# Patient Record
Sex: MALE | Race: WHITE | NOT HISPANIC OR LATINO | URBAN - METROPOLITAN AREA
[De-identification: names, ages, dates, MRNs, and addresses within clinical notes are randomized per-mention and may not be internally consistent; named-entity substitution may affect disease eponyms.]

---

## 2017-05-23 ENCOUNTER — EMERGENCY (EMERGENCY)
Facility: HOSPITAL | Age: 67
LOS: 1 days | Discharge: ANOTHER HOSPITAL | End: 2017-05-23
Attending: EMERGENCY MEDICINE
Payer: COMMERCIAL

## 2017-05-23 VITALS
OXYGEN SATURATION: 99 % | HEART RATE: 67 BPM | DIASTOLIC BLOOD PRESSURE: 84 MMHG | RESPIRATION RATE: 18 BRPM | WEIGHT: 259.93 LBS | HEIGHT: 67 IN | TEMPERATURE: 98 F | SYSTOLIC BLOOD PRESSURE: 139 MMHG

## 2017-05-23 DIAGNOSIS — R51 HEADACHE: ICD-10-CM

## 2017-05-23 DIAGNOSIS — G70.00 MYASTHENIA GRAVIS WITHOUT (ACUTE) EXACERBATION: ICD-10-CM

## 2017-05-23 DIAGNOSIS — G08 INTRACRANIAL AND INTRASPINAL PHLEBITIS AND THROMBOPHLEBITIS: ICD-10-CM

## 2017-05-23 LAB
ALBUMIN SERPL ELPH-MCNC: 3.5 G/DL — SIGNIFICANT CHANGE UP (ref 3.3–5)
ALP SERPL-CCNC: 63 U/L — SIGNIFICANT CHANGE UP (ref 40–120)
ALT FLD-CCNC: 28 U/L — SIGNIFICANT CHANGE UP (ref 12–78)
ANION GAP SERPL CALC-SCNC: 11 MMOL/L — SIGNIFICANT CHANGE UP (ref 5–17)
APTT BLD: 31.1 SEC — SIGNIFICANT CHANGE UP (ref 27.5–37.4)
AST SERPL-CCNC: 36 U/L — SIGNIFICANT CHANGE UP (ref 15–37)
BASOPHILS # BLD AUTO: 0.1 K/UL — SIGNIFICANT CHANGE UP (ref 0–0.2)
BASOPHILS NFR BLD AUTO: 0.7 % — SIGNIFICANT CHANGE UP (ref 0–2)
BILIRUB SERPL-MCNC: 0.5 MG/DL — SIGNIFICANT CHANGE UP (ref 0.2–1.2)
BUN SERPL-MCNC: 21 MG/DL — SIGNIFICANT CHANGE UP (ref 7–23)
CALCIUM SERPL-MCNC: 8.7 MG/DL — SIGNIFICANT CHANGE UP (ref 8.5–10.1)
CHLORIDE SERPL-SCNC: 104 MMOL/L — SIGNIFICANT CHANGE UP (ref 96–108)
CK MB BLD-MCNC: 1.1 % — SIGNIFICANT CHANGE UP (ref 0–3.5)
CK MB CFR SERPL CALC: 1.6 NG/ML — SIGNIFICANT CHANGE UP (ref 0.5–3.6)
CK SERPL-CCNC: 143 U/L — SIGNIFICANT CHANGE UP (ref 26–308)
CO2 SERPL-SCNC: 24 MMOL/L — SIGNIFICANT CHANGE UP (ref 22–31)
CREAT SERPL-MCNC: 1.05 MG/DL — SIGNIFICANT CHANGE UP (ref 0.5–1.3)
EOSINOPHIL # BLD AUTO: 0.1 K/UL — SIGNIFICANT CHANGE UP (ref 0–0.5)
EOSINOPHIL NFR BLD AUTO: 0.7 % — SIGNIFICANT CHANGE UP (ref 0–6)
GLUCOSE SERPL-MCNC: 141 MG/DL — HIGH (ref 70–99)
HCT VFR BLD CALC: 43.8 % — SIGNIFICANT CHANGE UP (ref 39–50)
HGB BLD-MCNC: 15.2 G/DL — SIGNIFICANT CHANGE UP (ref 13–17)
INR BLD: 1.12 RATIO — SIGNIFICANT CHANGE UP (ref 0.88–1.16)
LYMPHOCYTES # BLD AUTO: 1.8 K/UL — SIGNIFICANT CHANGE UP (ref 1–3.3)
LYMPHOCYTES # BLD AUTO: 14.7 % — SIGNIFICANT CHANGE UP (ref 13–44)
MCHC RBC-ENTMCNC: 29.5 PG — SIGNIFICANT CHANGE UP (ref 27–34)
MCHC RBC-ENTMCNC: 34.6 GM/DL — SIGNIFICANT CHANGE UP (ref 32–36)
MCV RBC AUTO: 85.2 FL — SIGNIFICANT CHANGE UP (ref 80–100)
MONOCYTES # BLD AUTO: 0.7 K/UL — SIGNIFICANT CHANGE UP (ref 0–0.9)
MONOCYTES NFR BLD AUTO: 6.1 % — SIGNIFICANT CHANGE UP (ref 2–14)
NEUTROPHILS # BLD AUTO: 9.5 K/UL — HIGH (ref 1.8–7.4)
NEUTROPHILS NFR BLD AUTO: 77.9 % — HIGH (ref 43–77)
PLATELET # BLD AUTO: 229 K/UL — SIGNIFICANT CHANGE UP (ref 150–400)
POTASSIUM SERPL-MCNC: 4.6 MMOL/L — SIGNIFICANT CHANGE UP (ref 3.5–5.3)
POTASSIUM SERPL-SCNC: 4.6 MMOL/L — SIGNIFICANT CHANGE UP (ref 3.5–5.3)
PROT SERPL-MCNC: 7.5 GM/DL — SIGNIFICANT CHANGE UP (ref 6–8.3)
PROTHROM AB SERPL-ACNC: 12.2 SEC — SIGNIFICANT CHANGE UP (ref 9.8–12.7)
RBC # BLD: 5.14 M/UL — SIGNIFICANT CHANGE UP (ref 4.2–5.8)
RBC # FLD: 13 % — SIGNIFICANT CHANGE UP (ref 11–15)
SODIUM SERPL-SCNC: 139 MMOL/L — SIGNIFICANT CHANGE UP (ref 135–145)
TROPONIN I SERPL-MCNC: <.015 NG/ML — SIGNIFICANT CHANGE UP (ref 0.01–0.04)
WBC # BLD: 12.2 K/UL — HIGH (ref 3.8–10.5)
WBC # FLD AUTO: 12.2 K/UL — HIGH (ref 3.8–10.5)

## 2017-05-23 PROCEDURE — 70498 CT ANGIOGRAPHY NECK: CPT | Mod: 26

## 2017-05-23 PROCEDURE — 99292 CRITICAL CARE ADDL 30 MIN: CPT

## 2017-05-23 PROCEDURE — 99291 CRITICAL CARE FIRST HOUR: CPT

## 2017-05-23 PROCEDURE — 70496 CT ANGIOGRAPHY HEAD: CPT | Mod: 26

## 2017-05-23 PROCEDURE — 70450 CT HEAD/BRAIN W/O DYE: CPT | Mod: 26,59

## 2017-05-23 RX ORDER — MORPHINE SULFATE 50 MG/1
4 CAPSULE, EXTENDED RELEASE ORAL ONCE
Qty: 0 | Refills: 0 | Status: DISCONTINUED | OUTPATIENT
Start: 2017-05-23 | End: 2017-05-23

## 2017-05-23 RX ORDER — HEPARIN SODIUM 5000 [USP'U]/ML
4500 INJECTION INTRAVENOUS; SUBCUTANEOUS EVERY 6 HOURS
Qty: 0 | Refills: 0 | Status: DISCONTINUED | OUTPATIENT
Start: 2017-05-23 | End: 2017-05-27

## 2017-05-23 RX ORDER — ACETAMINOPHEN 500 MG
975 TABLET ORAL ONCE
Qty: 0 | Refills: 0 | Status: COMPLETED | OUTPATIENT
Start: 2017-05-23 | End: 2017-05-23

## 2017-05-23 RX ORDER — HEPARIN SODIUM 5000 [USP'U]/ML
INJECTION INTRAVENOUS; SUBCUTANEOUS
Qty: 25000 | Refills: 0 | Status: DISCONTINUED | OUTPATIENT
Start: 2017-05-23 | End: 2017-05-27

## 2017-05-23 RX ORDER — HEPARIN SODIUM 5000 [USP'U]/ML
9500 INJECTION INTRAVENOUS; SUBCUTANEOUS EVERY 6 HOURS
Qty: 0 | Refills: 0 | Status: DISCONTINUED | OUTPATIENT
Start: 2017-05-23 | End: 2017-05-27

## 2017-05-23 RX ORDER — ONDANSETRON 8 MG/1
8 TABLET, FILM COATED ORAL ONCE
Qty: 0 | Refills: 0 | Status: COMPLETED | OUTPATIENT
Start: 2017-05-23 | End: 2017-05-23

## 2017-05-23 RX ORDER — HEPARIN SODIUM 5000 [USP'U]/ML
9500 INJECTION INTRAVENOUS; SUBCUTANEOUS ONCE
Qty: 0 | Refills: 0 | Status: COMPLETED | OUTPATIENT
Start: 2017-05-23 | End: 2017-05-24

## 2017-05-23 RX ADMIN — Medication 975 MILLIGRAM(S): at 23:07

## 2017-05-23 NOTE — ED PROVIDER NOTE - OBJECTIVE STATEMENT
66 year old male with PMH of myasthenia gravis presenting to ED due to headache x 3 days, with sudden acute hearing loss noted today about 45 minutes prior to arrival in ED. Denies any other focal deficits otherwise feeling some neck discomfort and headache has been splitting now for 3 days. Pt seen at triage as a possible code stroke - immediate CT head performed - will plan on CTA shortly after.

## 2017-05-23 NOTE — ED PROVIDER NOTE - MEDICAL DECISION MAKING DETAILS
pt noted to have left transverse sinus and sigmoid sinus thrombosis noted otherwise to start heparin in ED - called Dr. Goyal who asked for Neurosurgery consult - Dr. Olmedo consulted to transfer for further care -

## 2017-05-23 NOTE — ED PROVIDER NOTE - CRITICAL CARE PROVIDED
direct patient care (not related to procedure)/interpretation of diagnostic studies/consultation with other physicians

## 2017-05-23 NOTE — CONSULT NOTE ADULT - SUBJECTIVE AND OBJECTIVE BOX
Subjective Complaints:  Historian:           ER notes also reviewed,.  Consult requested by ER doctor:                  Attending:     NARINDER:   PHOENIX MOLINA:  · Chief Complaint: The patient is a 66y Male complaining of headache.  HPI Objective Statement:  PMH of myasthenia gravis presenting to ED due to headache x 3 days, with sudden acute hearing loss noted today about 45 minutes prior to arrival in ED. Denies any other focal deficits otherwise feeling some neck discomfort and headache has been splitting now for 3 days. Pt seen at triage as a possible code stroke - immediate CT head performed - presently undergoing brain and neck  CTA.  Patient in Radiology     Radiology Results:  Unremarkable finding of the brain    PAST MEDICAL & SURGICAL HISTORY:  Myasthenia gravis    MEDICATIONS  (STANDING):  MEDICATIONS  (PRN):  Allergies: No Known Allergies  Intolerances  FAMILY HISTORY:    Vital Signs Last 24 Hrs  T(C): 36.6, Max: 36.6 (05-23 @ 19:27)  T(F): 97.8, Max: 97.8 (05-23 @ 19:27)  HR: 70 (67 - 70)  BP: 102/60 (102/60 - 139/84)  BP(mean): --  RR: 18 (18 - 18)  SpO2: 97% (97% - 99%)    NEUROLOGICAL EXAM:  HENT:  Normocephalic head; atraumatic head.  Neck supple.  ENT: normal looking.  Mental State:    Alert.  Fully oriented to person, place and date.  Coherent.  Speech clear and intact.  Cooperative.  Responds appropriately.    Cranial Nerves:  II-XII:   Pupils round and reactive to light and accommodation.  Extraocular movements full.  Visual fields full (no homonymous hemianopsia).  Visual acuity wnl.  Facial symmetry intact.  Tongue midline.  Motor Functions:  Moves all extremities.  No pronator drift of UE.  Claps hand well.  Hand  intact bilaterally.  Ambulatory.    Sensory Functions:   Intact to touch and pinprick to face and extremities.    Reflexes:  Deep tendon reflexes normoactive to biceps, knees and ankles.  Babinski absent (present).  Cerebellar Testing:    Finger to nose intact.  Nystagmus absent.  Neurovascular: Carotid auscultation full without bruits.      LABS:                        15.2   12.2  )-----------( 229      ( 23 May 2017 20:50 )             43.8     05-23    139  |  104  |  21  ----------------------------<  141<H>  4.6   |  24  |  1.05    Ca    8.7      23 May 2017 20:50    TPro  7.5  /  Alb  3.5  /  TBili  0.5  /  DBili  x   /  AST  36  /  ALT  28  /  AlkPhos  63  05-23    PT/INR - ( 23 May 2017 20:49 )   PT: 12.2 sec;   INR: 1.12 ratio         PTT - ( 23 May 2017 20:49 )  PTT:31.1 sec    RADIOLOGY & ADDITIONAL STUDIES:    CT Brain Stroke Protocol: Urgent   Indication: headache 3 days, loss of hearing 45 min  Transport: Stretcher-Crib  Exam Completed  Provider&#x27;s Contact #: [phone] (05-23 @ 19:41)  National Institutes of Health Stroke Scale Score:     Time/Priority:  STAT    Additional Instructions:  Type score here ___ (05-23 @ 19:43)  Comprehensive Metabolic Panel: STAT (05-23 @ 19:43)  Complete Blood Count + Automated Diff: STAT (05-23 @ 19:43)  Troponin I, Serum: STAT (05-23 @ 19:43)  Creatine Kinase, Serum: STAT (05-23 @ 19:43)  CKMB Mass Assay: STAT (05-23 @ 19:43)  Troponin I, Serum: STAT  Cancel Reason: Lab Reord. (05-23 @ 19:43)  Vital Signs:     Frequency:  every 1 hour (05-23 @ 19:43)  Pulse Oximetry:   Frequency: &lt;Continuous&gt;    Additional Instructions:  Notify Provider if oxygen saturation is LESS THAN 92% (05-23 @ 19:43)  Cardiac Monitor Bedside:     Time/Priority:  STAT (05-23 @ 19:43)  12 Lead ECG:   Provider&#x27;s Contact #: [phone] (05-23 @ 19:43)  Dysphagia Screening:     Time/Priority:  STAT (05-23 @ 19:43)  Assess Neurological Status:     Type of Neuro Check:  Stroke    Frequency:  every 1 hour    Additional Instructions:  Perform stroke neurological check (05-23 @ 19:43)  Provider to RN:       Assess Level of Consciousness, using [place] Coma Scale (05-23 @ 19:43)  Education:     Other: Stroke education    Additional Instructions: Distribute Stroke education material and provide stroke education (05-23 @ 19:43)  Blood Glucose Point Of Care Testing:     Frequency:  once (05-23 @ 19:43)  Activity - Bedrest (05-23 @ 19:43)  Fall Risk Protocol:     Time/Priority:  STAT    Additional Instructions:  Follow fall risk protocol (05-23 @ 19:43)  Aspiration Precautions:     Time/Priority:  Routine (05-23 @ 19:43)  Seizure Precautions:     Time/Priority:  Routine (05-23 @ 19:43)  Diet, NPO (05-23 @ 19:43)  Type + Screen: Routine  Cancel Reason: Hemolyzed Redraw (05-23 @ 19:47)  Prothrombin Time and INR, Plasma:  Start Date:23-May-2017. STAT (05-23 @ 19:48)  Activated Partial Thromboplastin Time:  Start Date:23-May-2017. STAT (05-23 @ 19:48)  CT Angio Neck w/ IV Cont: Urgent   Indication: neck pain, headache  loss of hearing  Transport: Stretcher-Crib  Provider&#x27;s Contact #: [phone] (05-23 @ 20:11)  CT Angio Head w/ IV Cont: Urgent   Indication: neck pain, headache  loss of hearing  Transport: Stretcher-Crib  Exam in Progress  Provider&#x27;s Contact #: [phone] (05-23 @ 20:11)  Antibody Screen: 20:23  Cancel Reason: Hemolyzed Redraw (05-23 @ 20:49)    Assessment & Opinion:    Recommendations:  Brain MRI.  Carotid doppler.  Echocardiogram.  EEG.   DVT prophylaxis as ordered.  Medications: Subjective Complaints:  Historian:  Patient (somewhat inconsistent with his complaints).  Wife and daughter at bedside.   ER notes also reviewed,.    HPI:   PHOENIX TRACY:  · Chief Complaint: The patient is a 66y Male complaining of headache.  HPI Objective Statement:  PMH of myasthenia gravis presenting to ED due to headache x 3 days, with sudden acute hearing loss noted today about 45 minutes prior to arrival in ED. Denies any other focal deficits otherwise feeling some neck discomfort and headache has been splitting now for 3 days. Pt seen at triage as a possible code stroke - immediate CT head performed - presently undergoing brain and neck  CTA.  Patient in Radiology.    Radiology Results:  Unremarkable finding of the brain.  CTA results pending    PAST MEDICAL & SURGICAL HISTORY:  Myasthenia gravis    MEDICATIONS  (STANDING):  MEDICATIONS  (PRN):  Allergies: No Known Allergies  Intolerances  FAMILY HISTORY:    Vital Signs Last 24 Hrs  T(C): 36.6, Max: 36.6 (05-23 @ 19:27)  T(F): 97.8, Max: 97.8 (05-23 @ 19:27)  HR: 70 (67 - 70)  BP: 102/60 (102/60 - 139/84)  BP(mean): --  RR: 18 (18 - 18)  SpO2: 97% (97% - 99%)    NEUROLOGICAL EXAM:  HENT:  Normocephalic head; atraumatic head.  Neck with subjective restriction but non-tender.  ENT: normal looking.  Mental State:    Alert.  Fully oriented to person, place and date.  Coherent.  Speech clear and intact.  Cooperative.  Responds appropriately.    Cranial Nerves:  II-XII:   Pupils round and reactive to light and accommodation.  Extraocular movements full.  Visual fields full (no homonymous hemianopsia).  Visual acuity wnl.  Facial symmetry intact.  Tongue midline.  Hearing grossly within normal  Motor Functions:  Moves all extremities.  No pronator drift of UE.  Sensory Functions:   Intact to touch and pinprick to face and extremities.    Reflexes:  Deep tendon reflexes hypoactive to knees and ankles.  Babinski absent (present).  Cerebellar Testing:    Finger to nose intact.  Neurovascular: Carotid auscultation full without bruits.      LABS:                        15.2   12.2  )-----------( 229      ( 23 May 2017 20:50 )             43.8     05-23    139  |  104  |  21  ----------------------------<  141<H>  4.6   |  24  |  1.05    Ca    8.7      23 May 2017 20:50    TPro  7.5  /  Alb  3.5  /  TBili  0.5  /  DBili  x   /  AST  36  /  ALT  28  /  AlkPhos  63  05-23    PT/INR - ( 23 May 2017 20:49 )   PT: 12.2 sec;   INR: 1.12 ratio         PTT - ( 23 May 2017 20:49 )  PTT:31.1 sec    RADIOLOGY & ADDITIONAL STUDIES:    CT Brain Stroke Protocol: Urgent   Indication: headache 3 days, loss of hearing 45 min  Transport: Stretcher-Crib  Exam Completed  Provider&#x27;s Contact #: [phone] (05-23 @ 19:41)  National Institutes of Health Stroke Scale Score:     Time/Priority:  STAT    Additional Instructions:  Type score here ___ (05-23 @ 19:43)  Comprehensive Metabolic Panel: STAT (05-23 @ 19:43)  Complete Blood Count + Automated Diff: STAT (05-23 @ 19:43)  Troponin I, Serum: STAT (05-23 @ 19:43)  Creatine Kinase, Serum: STAT (05-23 @ 19:43)  CKMB Mass Assay: STAT (05-23 @ 19:43)  Troponin I, Serum: STAT  Cancel Reason: Lab Reord. (05-23 @ 19:43)  Vital Signs:     Frequency:  every 1 hour (05-23 @ 19:43)  Pulse Oximetry:   Frequency: &lt;Continuous&gt;    Additional Instructions:  Notify Provider if oxygen saturation is LESS THAN 92% (05-23 @ 19:43)  Cardiac Monitor Bedside:     Time/Priority:  STAT (05-23 @ 19:43)  12 Lead ECG:   Provider&#x27;s Contact #: [phone] (05-23 @ 19:43)  Dysphagia Screening:     Time/Priority:  STAT (05-23 @ 19:43)  Assess Neurological Status:     Type of Neuro Check:  Stroke    Frequency:  every 1 hour    Additional Instructions:  Perform stroke neurological check (05-23 @ 19:43)  Provider to RN:       Assess Level of Consciousness, using [place] Coma Scale (05-23 @ 19:43)  Education:     Other: Stroke education    Additional Instructions: Distribute Stroke education material and provide stroke education (05-23 @ 19:43)  Blood Glucose Point Of Care Testing:     Frequency:  once (05-23 @ 19:43)  Activity - Bedrest (05-23 @ 19:43)  Fall Risk Protocol:     Time/Priority:  STAT    Additional Instructions:  Follow fall risk protocol (05-23 @ 19:43)  Aspiration Precautions:     Time/Priority:  Routine (05-23 @ 19:43)  Seizure Precautions:     Time/Priority:  Routine (05-23 @ 19:43)  Diet, NPO (05-23 @ 19:43)  Type + Screen: Routine  Cancel Reason: Hemolyzed Redraw (05-23 @ 19:47)  Prothrombin Time and INR, Plasma:  Start Date:23-May-2017. STAT (05-23 @ 19:48)  Activated Partial Thromboplastin Time:  Start Date:23-May-2017. STAT (05-23 @ 19:48)  CT Angio Neck w/ IV Cont: Urgent   Indication: neck pain, headache  loss of hearing  Transport: Stretcher-Crib  Provider&#x27;s Contact #: [phone] (05-23 @ 20:11)  CT Angio Head w/ IV Cont: Urgent   Indication: neck pain, headache  loss of hearing  Transport: Stretcher-Crib  Exam in Progress  Provider&#x27;s Contact #: [phone] (05-23 @ 20:11)  Antibody Screen: 20:23  Cancel Reason: Hemolyzed Redraw (05-23 @ 20:49)    Assessment & Opinion:  Subjective Cephalgia.  Subjective Forehead tenderness.  Stable Myasthenia Gravis. Essentially unremarkable neurologic examination.  Recommendations:  If patient is still symptomatic and unable to ambulate, may have to be admitted for further observation and Brain MRI.  Carotid doppler.  Echocardiogram.  EEG.   DVT prophylaxis as ordered.  Symptomatic medications. Subjective Complaints:  Historian:  Patient (somewhat inconsistent with his complaints).  Wife and daughter at bedside.   ER notes also reviewed,.    HPI:   PHOENIX TRACY:  · Chief Complaint: The patient is a 66y Male complaining of headache.  HPI Objective Statement:  PMH of myasthenia gravis presenting to ED due to headache x 3 days, with sudden acute hearing loss noted today about 45 minutes prior to arrival in ED. Denies any other focal deficits otherwise feeling some neck discomfort and headache has been splitting now for 3 days. Pt seen at triage as a possible code stroke - immediate CT head performed - presently undergoing brain and neck  CTA.  Patient in Radiology.    Radiology Results:  Unremarkable finding of the brain.  CTA results pending    PAST MEDICAL & SURGICAL HISTORY:  Myasthenia gravis    MEDICATIONS  (STANDING):  MEDICATIONS  (PRN):  Allergies: No Known Allergies  Intolerances  FAMILY HISTORY:    Vital Signs Last 24 Hrs  T(C): 36.6, Max: 36.6 (05-23 @ 19:27)  T(F): 97.8, Max: 97.8 (05-23 @ 19:27)  HR: 70 (67 - 70)  BP: 102/60 (102/60 - 139/84)  BP(mean): --  RR: 18 (18 - 18)  SpO2: 97% (97% - 99%)    NEUROLOGICAL EXAM:  HENT:  Normocephalic head; atraumatic head.  Neck with subjective restriction but non-tender.  ENT: normal looking.  Mental State:    Alert.  Fully oriented to person, place and date.  Coherent.  Speech clear and intact.  Cooperative.  Responds appropriately.    Cranial Nerves:  II-XII:   Pupils round and reactive to light and accommodation.  Extraocular movements full.  Visual fields full (no homonymous hemianopsia).  Visual acuity wnl.  Facial symmetry intact.  Tongue midline.  Hearing grossly within normal  Motor Functions:  Moves all extremities.  No pronator drift of UE.  Sensory Functions:   Intact to touch and pinprick to face and extremities.    Reflexes:  Deep tendon reflexes hypoactive to knees and ankles.  Babinski absent (present).  Cerebellar Testing:    Finger to nose intact.  Neurovascular: Carotid auscultation full without bruits.      LABS:                        15.2   12.2  )-----------( 229      ( 23 May 2017 20:50 )             43.8     05-23    139  |  104  |  21  ----------------------------<  141<H>  4.6   |  24  |  1.05    Ca    8.7      23 May 2017 20:50    TPro  7.5  /  Alb  3.5  /  TBili  0.5  /  DBili  x   /  AST  36  /  ALT  28  /  AlkPhos  63  05-23    PT/INR - ( 23 May 2017 20:49 )   PT: 12.2 sec;   INR: 1.12 ratio         PTT - ( 23 May 2017 20:49 )  PTT:31.1 sec    RADIOLOGY & ADDITIONAL STUDIES:    CT Brain Stroke Protocol: Urgent   Indication: headache 3 days, loss of hearing 45 min  Transport: Stretcher-Crib  Exam Completed  Provider&#x27;s Contact #: [phone] (05-23 @ 19:41)  National Institutes of Health Stroke Scale Score:     Time/Priority:  STAT    Additional Instructions:  Type score here ___ (05-23 @ 19:43)  Comprehensive Metabolic Panel: STAT (05-23 @ 19:43)  Complete Blood Count + Automated Diff: STAT (05-23 @ 19:43)  Troponin I, Serum: STAT (05-23 @ 19:43)  Creatine Kinase, Serum: STAT (05-23 @ 19:43)  CKMB Mass Assay: STAT (05-23 @ 19:43)  Troponin I, Serum: STAT  Cancel Reason: Lab Reord. (05-23 @ 19:43)  Vital Signs:     Frequency:  every 1 hour (05-23 @ 19:43)  Pulse Oximetry:   Frequency: &lt;Continuous&gt;    Additional Instructions:  Notify Provider if oxygen saturation is LESS THAN 92% (05-23 @ 19:43)  Cardiac Monitor Bedside:     Time/Priority:  STAT (05-23 @ 19:43)  12 Lead ECG:   Provider&#x27;s Contact #: [phone] (05-23 @ 19:43)  Dysphagia Screening:     Time/Priority:  STAT (05-23 @ 19:43)  Assess Neurological Status:     Type of Neuro Check:  Stroke    Frequency:  every 1 hour    Additional Instructions:  Perform stroke neurological check (05-23 @ 19:43)  Provider to RN:       Assess Level of Consciousness, using [place] Coma Scale (05-23 @ 19:43)  Education:     Other: Stroke education    Additional Instructions: Distribute Stroke education material and provide stroke education (05-23 @ 19:43)  Blood Glucose Point Of Care Testing:     Frequency:  once (05-23 @ 19:43)  Activity - Bedrest (05-23 @ 19:43)  Fall Risk Protocol:     Time/Priority:  STAT    Additional Instructions:  Follow fall risk protocol (05-23 @ 19:43)  Aspiration Precautions:     Time/Priority:  Routine (05-23 @ 19:43)  Seizure Precautions:     Time/Priority:  Routine (05-23 @ 19:43)  Diet, NPO (05-23 @ 19:43)  Type + Screen: Routine  Cancel Reason: Hemolyzed Redraw (05-23 @ 19:47)  Prothrombin Time and INR, Plasma:  Start Date:23-May-2017. STAT (05-23 @ 19:48)  Activated Partial Thromboplastin Time:  Start Date:23-May-2017. STAT (05-23 @ 19:48)  CT Angio Neck w/ IV Cont: Urgent   Indication: neck pain, headache  loss of hearing  Transport: Stretcher-Crib  Provider&#x27;s Contact #: [phone] (05-23 @ 20:11)  CT Angio Head w/ IV Cont: Urgent   Indication: neck pain, headache  loss of hearing  Transport: Stretcher-Crib  Exam in Progress  Provider&#x27;s Contact #: [phone] (05-23 @ 20:11)  Antibody Screen: 20:23  Cancel Reason: Hemolyzed Redraw (05-23 @ 20:49)    Assessment & Opinion:  Subjective Cephalgia.  Subjective Forehead tenderness.  Stable Myasthenia Gravis. Essentially unremarkable neurologic examination.  Recommendations:  If patient is still symptomatic and unable to ambulate, may have to be admitted for further observation and Brain MRI.  Carotid doppler.  Echocardiogram.  EEG.   DVT prophylaxis as ordered.  Symptomatic medications. Case discussed with . Subjective Complaints:  Historian:  Patient (somewhat inconsistent with his complaints).  Wife and daughter at bedside.   ER notes also reviewed,.    HPI:   PHOENIX TRACY:  · Chief Complaint: The patient is a 66y Male complaining of headache.  HPI Objective Statement:  PMH of myasthenia gravis presenting to ED due to headache x 3 days, with sudden acute hearing loss noted today about 45 minutes prior to arrival in ED. Denies any other focal deficits otherwise feeling some neck discomfort and headache has been splitting now for 3 days. Pt seen at triage as a possible code stroke - immediate CT head performed - presently undergoing brain and neck  CTA.  Patient in Radiology.    Radiology Results:  Unremarkable finding of the brain.  CTA results pending    PAST MEDICAL & SURGICAL HISTORY:  Myasthenia gravis    MEDICATIONS  (STANDING):  MEDICATIONS  (PRN):  Allergies: No Known Allergies  Intolerances  FAMILY HISTORY:    Vital Signs Last 24 Hrs  T(C): 36.6, Max: 36.6 (05-23 @ 19:27)  T(F): 97.8, Max: 97.8 (05-23 @ 19:27)  HR: 70 (67 - 70)  BP: 102/60 (102/60 - 139/84)  BP(mean): --  RR: 18 (18 - 18)  SpO2: 97% (97% - 99%)    NEUROLOGICAL EXAM:  HENT:  Normocephalic head; atraumatic head.  Neck with subjective restriction but non-tender.  ENT: normal looking.  Mental State:    Alert.  Fully oriented to person, place and date.  Coherent.  Speech clear and intact.  Cooperative.  Responds appropriately.    Cranial Nerves:  II-XII:   Pupils round and reactive to light and accommodation.  Extraocular movements full.  Visual fields full (no homonymous hemianopsia).  Visual acuity wnl.  Facial symmetry intact.  Tongue midline.  Hearing grossly within normal  Motor Functions:  Moves all extremities.  No pronator drift of UE.  Sensory Functions:   Intact to touch and pinprick to face and extremities.    Reflexes:  Deep tendon reflexes hypoactive to knees and ankles.  Babinski absent (present).  Cerebellar Testing:    Finger to nose intact.  Neurovascular: Carotid auscultation full without bruits.      LABS:                        15.2   12.2  )-----------( 229      ( 23 May 2017 20:50 )             43.8     05-23    139  |  104  |  21  ----------------------------<  141<H>  4.6   |  24  |  1.05    Ca    8.7      23 May 2017 20:50    TPro  7.5  /  Alb  3.5  /  TBili  0.5  /  DBili  x   /  AST  36  /  ALT  28  /  AlkPhos  63  05-23    PT/INR - ( 23 May 2017 20:49 )   PT: 12.2 sec;   INR: 1.12 ratio         PTT - ( 23 May 2017 20:49 )  PTT:31.1 sec    RADIOLOGY & ADDITIONAL STUDIES:    CT Brain Stroke Protocol: Urgent   Indication: headache 3 days, loss of hearing 45 min  Transport: Stretcher-Crib  Exam Completed  Provider&#x27;s Contact #: [phone] (05-23 @ 19:41)  National Institutes of Health Stroke Scale Score:     Time/Priority:  STAT    Additional Instructions:  Type score here ___ (05-23 @ 19:43)  Comprehensive Metabolic Panel: STAT (05-23 @ 19:43)  Complete Blood Count + Automated Diff: STAT (05-23 @ 19:43)  Troponin I, Serum: STAT (05-23 @ 19:43)  Creatine Kinase, Serum: STAT (05-23 @ 19:43)  CKMB Mass Assay: STAT (05-23 @ 19:43)  Troponin I, Serum: STAT  Cancel Reason: Lab Reord. (05-23 @ 19:43)  Vital Signs:     Frequency:  every 1 hour (05-23 @ 19:43)  Pulse Oximetry:   Frequency: &lt;Continuous&gt;    Additional Instructions:  Notify Provider if oxygen saturation is LESS THAN 92% (05-23 @ 19:43)  Cardiac Monitor Bedside:     Time/Priority:  STAT (05-23 @ 19:43)  12 Lead ECG:   Provider&#x27;s Contact #: [phone] (05-23 @ 19:43)  Dysphagia Screening:     Time/Priority:  STAT (05-23 @ 19:43)  Assess Neurological Status:     Type of Neuro Check:  Stroke    Frequency:  every 1 hour    Additional Instructions:  Perform stroke neurological check (05-23 @ 19:43)  Provider to RN:       Assess Level of Consciousness, using [place] Coma Scale (05-23 @ 19:43)  Education:     Other: Stroke education    Additional Instructions: Distribute Stroke education material and provide stroke education (05-23 @ 19:43)  Blood Glucose Point Of Care Testing:     Frequency:  once (05-23 @ 19:43)  Activity - Bedrest (05-23 @ 19:43)  Fall Risk Protocol:     Time/Priority:  STAT    Additional Instructions:  Follow fall risk protocol (05-23 @ 19:43)  Aspiration Precautions:     Time/Priority:  Routine (05-23 @ 19:43)  Seizure Precautions:     Time/Priority:  Routine (05-23 @ 19:43)  Diet, NPO (05-23 @ 19:43)  Type + Screen: Routine  Cancel Reason: Hemolyzed Redraw (05-23 @ 19:47)  Prothrombin Time and INR, Plasma:  Start Date:23-May-2017. STAT (05-23 @ 19:48)  Activated Partial Thromboplastin Time:  Start Date:23-May-2017. STAT (05-23 @ 19:48)  CT Angio Neck w/ IV Cont: Urgent   Indication: neck pain, headache  loss of hearing  Transport: Stretcher-Crib  Provider&#x27;s Contact #: [phone] (05-23 @ 20:11)  CT Angio Head w/ IV Cont: Urgent   Indication: neck pain, headache  loss of hearing  Transport: Stretcher-Crib  Exam in Progress  Provider&#x27;s Contact #: [phone] (05-23 @ 20:11)  Antibody Screen: 20:23  Cancel Reason: Hemolyzed Redraw (05-23 @ 20:49)    Assessment & Opinion:   Subjective continuing frontal headaches with acute reduction of hearing. Essentially unremarkable neurologic examination. CTA findings of transverse venous thrombosis.  Recommendations:  If patient is still symptomatic and unable to ambulate, may have to be admitted for further observation and Brain MRI.  Carotid doppler.  Echocardiogram.  EEG.   DVT prophylaxis as ordered.  Symptomatic medications. Case discussed with Dr. Miranda.    Addendum:  Case discussed with Dr. Miranda after the CTA of head and neck results.  In view of the recent finding  of transverse venous thrombosis, it was prudent to transfer this patient to [facility].Neurosurgical opinion and management.  Patient accepted.

## 2017-05-23 NOTE — ED ADULT TRIAGE NOTE - CHIEF COMPLAINT QUOTE
"I had a bad headache" reports headache starting 3 days ago, sitting make headache worse no alleviating factors, loss of hearing occurred half hour PTA, denies numbness, tingling, slurred speech.

## 2017-05-23 NOTE — ED PROVIDER NOTE - CRITICAL CARE INDICATION, MLM
Patient was critically ill with a high probability of imminent or life threatening deterioration. patient was critically ill...

## 2017-05-24 ENCOUNTER — INPATIENT (INPATIENT)
Facility: HOSPITAL | Age: 67
LOS: 0 days | Discharge: HOME | DRG: 92 | End: 2017-05-25
Attending: PSYCHIATRY & NEUROLOGY | Admitting: PSYCHIATRY & NEUROLOGY
Payer: MEDICARE

## 2017-05-24 VITALS
OXYGEN SATURATION: 98 % | SYSTOLIC BLOOD PRESSURE: 171 MMHG | DIASTOLIC BLOOD PRESSURE: 78 MMHG | TEMPERATURE: 98 F | RESPIRATION RATE: 18 BRPM | HEART RATE: 68 BPM

## 2017-05-24 VITALS
SYSTOLIC BLOOD PRESSURE: 156 MMHG | RESPIRATION RATE: 16 BRPM | HEART RATE: 73 BPM | OXYGEN SATURATION: 99 % | WEIGHT: 259.93 LBS | TEMPERATURE: 98 F | DIASTOLIC BLOOD PRESSURE: 71 MMHG

## 2017-05-24 DIAGNOSIS — G08 INTRACRANIAL AND INTRASPINAL PHLEBITIS AND THROMBOPHLEBITIS: ICD-10-CM

## 2017-05-24 DIAGNOSIS — G70.00 MYASTHENIA GRAVIS WITHOUT (ACUTE) EXACERBATION: ICD-10-CM

## 2017-05-24 LAB
ANION GAP SERPL CALC-SCNC: 16 MMOL/L — SIGNIFICANT CHANGE UP (ref 5–17)
APTT BLD: 129.3 SEC — CRITICAL HIGH (ref 27.5–37.4)
APTT BLD: >200 SEC — CRITICAL HIGH (ref 27.5–37.4)
BUN SERPL-MCNC: 23 MG/DL — SIGNIFICANT CHANGE UP (ref 7–23)
CALCIUM SERPL-MCNC: 8.5 MG/DL — SIGNIFICANT CHANGE UP (ref 8.4–10.5)
CHLORIDE SERPL-SCNC: 103 MMOL/L — SIGNIFICANT CHANGE UP (ref 96–108)
CHOLEST SERPL-MCNC: 139 MG/DL — SIGNIFICANT CHANGE UP (ref 10–199)
CK MB CFR SERPL CALC: 2.2 NG/ML — SIGNIFICANT CHANGE UP (ref 0–6.7)
CO2 SERPL-SCNC: 21 MMOL/L — LOW (ref 22–31)
CREAT SERPL-MCNC: 0.79 MG/DL — SIGNIFICANT CHANGE UP (ref 0.5–1.3)
GLUCOSE SERPL-MCNC: 162 MG/DL — HIGH (ref 70–99)
HBA1C BLD-MCNC: 6.7 % — HIGH (ref 4–5.6)
HCT VFR BLD CALC: 42.5 % — SIGNIFICANT CHANGE UP (ref 39–50)
HCT VFR BLD CALC: 45.4 % — SIGNIFICANT CHANGE UP (ref 39–50)
HCYS SERPL-MCNC: 8.9 UMOL/L — SIGNIFICANT CHANGE UP (ref 5–20)
HDLC SERPL-MCNC: 43 MG/DL — SIGNIFICANT CHANGE UP (ref 40–125)
HGB BLD-MCNC: 14.1 G/DL — SIGNIFICANT CHANGE UP (ref 13–17)
HGB BLD-MCNC: 15.4 G/DL — SIGNIFICANT CHANGE UP (ref 13–17)
LIPID PNL WITH DIRECT LDL SERPL: 83 MG/DL — SIGNIFICANT CHANGE UP
MCHC RBC-ENTMCNC: 29.7 PG — SIGNIFICANT CHANGE UP (ref 27–34)
MCHC RBC-ENTMCNC: 30.5 PG — SIGNIFICANT CHANGE UP (ref 27–34)
MCHC RBC-ENTMCNC: 33.2 GM/DL — SIGNIFICANT CHANGE UP (ref 32–36)
MCHC RBC-ENTMCNC: 33.8 GM/DL — SIGNIFICANT CHANGE UP (ref 32–36)
MCV RBC AUTO: 89.5 FL — SIGNIFICANT CHANGE UP (ref 80–100)
MCV RBC AUTO: 90.2 FL — SIGNIFICANT CHANGE UP (ref 80–100)
PLATELET # BLD AUTO: 188 K/UL — SIGNIFICANT CHANGE UP (ref 150–400)
PLATELET # BLD AUTO: 190 K/UL — SIGNIFICANT CHANGE UP (ref 150–400)
POTASSIUM SERPL-MCNC: 4.7 MMOL/L — SIGNIFICANT CHANGE UP (ref 3.5–5.3)
POTASSIUM SERPL-SCNC: 4.7 MMOL/L — SIGNIFICANT CHANGE UP (ref 3.5–5.3)
RBC # BLD: 4.75 M/UL — SIGNIFICANT CHANGE UP (ref 4.2–5.8)
RBC # BLD: 5.04 M/UL — SIGNIFICANT CHANGE UP (ref 4.2–5.8)
RBC # FLD: 12.5 % — SIGNIFICANT CHANGE UP (ref 10.3–14.5)
RBC # FLD: 12.5 % — SIGNIFICANT CHANGE UP (ref 10.3–14.5)
SODIUM SERPL-SCNC: 140 MMOL/L — SIGNIFICANT CHANGE UP (ref 135–145)
TOTAL CHOLESTEROL/HDL RATIO MEASUREMENT: 3.2 RATIO — LOW (ref 3.4–9.6)
TRIGL SERPL-MCNC: 63 MG/DL — SIGNIFICANT CHANGE UP (ref 10–149)
TROPONIN T SERPL-MCNC: <0.01 NG/ML — SIGNIFICANT CHANGE UP (ref 0–0.06)
WBC # BLD: 10.5 K/UL — SIGNIFICANT CHANGE UP (ref 3.8–10.5)
WBC # BLD: 11.4 K/UL — HIGH (ref 3.8–10.5)
WBC # FLD AUTO: 10.5 K/UL — SIGNIFICANT CHANGE UP (ref 3.8–10.5)
WBC # FLD AUTO: 11.4 K/UL — HIGH (ref 3.8–10.5)

## 2017-05-24 PROCEDURE — 71260 CT THORAX DX C+: CPT | Mod: 26

## 2017-05-24 PROCEDURE — 99223 1ST HOSP IP/OBS HIGH 75: CPT

## 2017-05-24 PROCEDURE — 70546 MR ANGIOGRAPH HEAD W/O&W/DYE: CPT | Mod: 26,59

## 2017-05-24 PROCEDURE — 70450 CT HEAD/BRAIN W/O DYE: CPT | Mod: 26

## 2017-05-24 PROCEDURE — 74177 CT ABD & PELVIS W/CONTRAST: CPT | Mod: 26

## 2017-05-24 PROCEDURE — 70553 MRI BRAIN STEM W/O & W/DYE: CPT | Mod: 26

## 2017-05-24 PROCEDURE — 99285 EMERGENCY DEPT VISIT HI MDM: CPT | Mod: 25

## 2017-05-24 PROCEDURE — G0452: CPT | Mod: 26

## 2017-05-24 RX ORDER — MYCOPHENOLATE MOFETIL 250 MG/1
500 CAPSULE ORAL
Qty: 0 | Refills: 0 | Status: DISCONTINUED | OUTPATIENT
Start: 2017-05-24 | End: 2017-05-25

## 2017-05-24 RX ORDER — ONDANSETRON 8 MG/1
4 TABLET, FILM COATED ORAL ONCE
Qty: 0 | Refills: 0 | Status: COMPLETED | OUTPATIENT
Start: 2017-05-24 | End: 2017-05-24

## 2017-05-24 RX ORDER — METOCLOPRAMIDE HCL 10 MG
10 TABLET ORAL ONCE
Qty: 0 | Refills: 0 | Status: COMPLETED | OUTPATIENT
Start: 2017-05-24 | End: 2017-05-24

## 2017-05-24 RX ORDER — MORPHINE SULFATE 50 MG/1
2 CAPSULE, EXTENDED RELEASE ORAL ONCE
Qty: 0 | Refills: 0 | Status: DISCONTINUED | OUTPATIENT
Start: 2017-05-24 | End: 2017-05-24

## 2017-05-24 RX ORDER — ACETAMINOPHEN 500 MG
650 TABLET ORAL EVERY 6 HOURS
Qty: 0 | Refills: 0 | Status: DISCONTINUED | OUTPATIENT
Start: 2017-05-24 | End: 2017-05-25

## 2017-05-24 RX ORDER — HEPARIN SODIUM 5000 [USP'U]/ML
INJECTION INTRAVENOUS; SUBCUTANEOUS
Qty: 25000 | Refills: 0 | Status: DISCONTINUED | OUTPATIENT
Start: 2017-05-24 | End: 2017-05-24

## 2017-05-24 RX ORDER — HEPARIN SODIUM 5000 [USP'U]/ML
4500 INJECTION INTRAVENOUS; SUBCUTANEOUS EVERY 6 HOURS
Qty: 0 | Refills: 0 | Status: DISCONTINUED | OUTPATIENT
Start: 2017-05-24 | End: 2017-05-24

## 2017-05-24 RX ORDER — ENOXAPARIN SODIUM 100 MG/ML
120 INJECTION SUBCUTANEOUS
Qty: 0 | Refills: 0 | Status: DISCONTINUED | OUTPATIENT
Start: 2017-05-24 | End: 2017-05-25

## 2017-05-24 RX ORDER — SODIUM CHLORIDE 9 MG/ML
1000 INJECTION INTRAMUSCULAR; INTRAVENOUS; SUBCUTANEOUS
Qty: 0 | Refills: 0 | Status: DISCONTINUED | OUTPATIENT
Start: 2017-05-24 | End: 2017-05-25

## 2017-05-24 RX ORDER — HEPARIN SODIUM 5000 [USP'U]/ML
2100 INJECTION INTRAVENOUS; SUBCUTANEOUS
Qty: 25000 | Refills: 0 | Status: DISCONTINUED | OUTPATIENT
Start: 2017-05-24 | End: 2017-05-24

## 2017-05-24 RX ORDER — ONDANSETRON 8 MG/1
8 TABLET, FILM COATED ORAL EVERY 8 HOURS
Qty: 0 | Refills: 0 | Status: DISCONTINUED | OUTPATIENT
Start: 2017-05-24 | End: 2017-05-25

## 2017-05-24 RX ORDER — HEPARIN SODIUM 5000 [USP'U]/ML
9500 INJECTION INTRAVENOUS; SUBCUTANEOUS EVERY 6 HOURS
Qty: 0 | Refills: 0 | Status: DISCONTINUED | OUTPATIENT
Start: 2017-05-24 | End: 2017-05-24

## 2017-05-24 RX ADMIN — ONDANSETRON 4 MILLIGRAM(S): 8 TABLET, FILM COATED ORAL at 06:11

## 2017-05-24 RX ADMIN — ONDANSETRON 8 MILLIGRAM(S): 8 TABLET, FILM COATED ORAL at 00:05

## 2017-05-24 RX ADMIN — Medication 650 MILLIGRAM(S): at 05:37

## 2017-05-24 RX ADMIN — MORPHINE SULFATE 4 MILLIGRAM(S): 50 CAPSULE, EXTENDED RELEASE ORAL at 00:05

## 2017-05-24 RX ADMIN — ONDANSETRON 8 MILLIGRAM(S): 8 TABLET, FILM COATED ORAL at 17:29

## 2017-05-24 RX ADMIN — HEPARIN SODIUM 2100 UNIT(S)/HR: 5000 INJECTION INTRAVENOUS; SUBCUTANEOUS at 00:06

## 2017-05-24 RX ADMIN — ONDANSETRON 4 MILLIGRAM(S): 8 TABLET, FILM COATED ORAL at 19:33

## 2017-05-24 RX ADMIN — ONDANSETRON 4 MILLIGRAM(S): 8 TABLET, FILM COATED ORAL at 23:16

## 2017-05-24 RX ADMIN — HEPARIN SODIUM 9500 UNIT(S): 5000 INJECTION INTRAVENOUS; SUBCUTANEOUS at 00:05

## 2017-05-24 RX ADMIN — ENOXAPARIN SODIUM 120 MILLIGRAM(S): 100 INJECTION SUBCUTANEOUS at 09:15

## 2017-05-24 RX ADMIN — Medication 650 MILLIGRAM(S): at 19:33

## 2017-05-24 RX ADMIN — ENOXAPARIN SODIUM 120 MILLIGRAM(S): 100 INJECTION SUBCUTANEOUS at 20:30

## 2017-05-24 RX ADMIN — Medication 10 MILLIGRAM(S): at 01:45

## 2017-05-24 RX ADMIN — MORPHINE SULFATE 2 MILLIGRAM(S): 50 CAPSULE, EXTENDED RELEASE ORAL at 17:25

## 2017-05-24 RX ADMIN — MYCOPHENOLATE MOFETIL 500 MILLIGRAM(S): 250 CAPSULE ORAL at 17:30

## 2017-05-24 RX ADMIN — HEPARIN SODIUM 2100 UNIT(S)/HR: 5000 INJECTION INTRAVENOUS; SUBCUTANEOUS at 03:10

## 2017-05-24 RX ADMIN — Medication 650 MILLIGRAM(S): at 12:24

## 2017-05-24 NOTE — DISCHARGE NOTE ADULT - CARE PLAN
Principal Discharge DX:	Cerebral venous sinus thrombosis  Goal:	prevent recurrence  Instructions for follow-up, activity and diet:	take medications as indicated  return to the hospital or call your physician immediately for any new symptoms   follow up with Dr. lAston Principal Discharge DX:	Cerebral venous sinus thrombosis  Goal:	prevent recurrence  Instructions for follow-up, activity and diet:	take medications as indicated  return to the hospital or call your physician immediately for any new symptoms   follow up with Dr. Alston

## 2017-05-24 NOTE — ED PROVIDER NOTE - OBJECTIVE STATEMENT
BIBA from [place] pt has cerebral sinus thrombus, reporting HA and Nausea improved as he was medication upon arrival of EMS for transport BIBA from [place] pt has cerebral sinus thrombus, reporting HA and Nausea improved as he was medication upon arrival of EMS for transport. Placed on Heparin drip.   CTA  BRAIN:  High  density  along  the  left  transverse  and  sigmoid  sinus  highly suspicious  for  venous  thrombosis.  This  can  be  confirmed  on  an  MRV. BIBA from [place] pt has cerebral sinus thrombus, reporting HA and Nausea improved as he was medication upon arrival of EMS for transport. Placed on Heparin drip.   CTA  BRAIN:  High  density  along  the  left  transverse  and  sigmoid  sinus  highly suspicious  for  venous  thrombosis.  This  can  be  confirmed  on  an  MRV.  Nilajenn Agueda HPI: "66 year old male with PMH of myasthenia gravis presenting to ED due to headache x 3 days, with sudden acute hearing loss noted today about 45 minutes prior to arrival in ED. Denies any other focal deficits otherwise feeling some neck discomfort and headache has been splitting now for 3 days."

## 2017-05-24 NOTE — ED ADULT NURSE REASSESSMENT NOTE - NS ED NURSE REASSESS COMMENT FT1
Called pharmacy to verify heparin order that Dr. Onael placed. Confirmed with pharmacy and Dr. Oneal platelet count is 229 from previous hospital. Full anticoagulation nomogram used by previous hospital and Dr. Oneal.
will draw repeat ptt at 0530 according to transfer paperwork

## 2017-05-24 NOTE — ED PROVIDER NOTE - NS ED ROS FT
No fever, no chills, no change in vision, no change in hearing, no chest pain, no shortness of breath, no abdominal pain, no vomiting, no dysuria, no muscle pain, no rashes, no loss of consciousness, + headache. ~ Nico Rosenberg MD

## 2017-05-24 NOTE — ED PROVIDER NOTE - ATTENDING CONTRIBUTION TO CARE
66yoM with HA x 3 days, found to have CTA suspicious for cerebral venous thrombus at [place].  On exam, AAOx3. Neurologically intact.   A: HA, Cerebral veein thrombosis  P: cont hep gtt. neuro consult, admit.

## 2017-05-24 NOTE — DIETITIAN INITIAL EVALUATION ADULT. - OTHER INFO
Patient referred for BMI over 40.  As per RN, patient is also a new diabetic with A1c 6.7.  Patient found sitting in chair, pleasant but very [place].  Patient reports a good appetite PTA but admits that he went off his plan.  Patient admits that he was told he may be diabetic, prediabetic and was checking finger sticks at home.  Fasting in the morning was 120-130.  Breakfast may include 2 eggs and toast and coffee in the morning.  Skipped lunch.  Dinner is usually chicken, beef or fish with mashed potatoes and vegetable.  Patient admits that after dinner between 8-10:00 P.m., he would snack on cakes, chips and devil dogs.  Drinks mostly water.  Highest weight was 270 pounds but now stable at 260 pounds.  Patient receptive to learning about diabetes meal planning and able to teach back some points.

## 2017-05-24 NOTE — H&P ADULT. - CRANIAL NERVE
EOMi, PERRLA, no nystagmus, no APD,  VVF, V1-V3 normal, no facial assymetry, normal shrug, tongue midline EOMi, PERRLA, no nystagmus, no APD,  VVF, V1-V3 normal, no facial assymetry, normal shrug, tongue midline, difficulty hearing

## 2017-05-24 NOTE — OCCUPATIONAL THERAPY INITIAL EVALUATION ADULT - ANTICIPATED DISCHARGE DISPOSITION, OT EVAL
home w/ OT/Home with Home OT for functional mobility, balance, ADLs, home safety evaluation and cognition

## 2017-05-24 NOTE — ED ADULT NURSE REASSESSMENT NOTE - NS ED NURSE REASSESS COMMENT FT1
pt with a change in ct result for r/o vascular thrombosis, given zofran 8mg iv,morphine 4mg iv,heparin [id number], and started on heparin drip at 2100unit, pt stable, not in any distress, vss, pt being transferred out to [facility], left via [facility] ambulance accompanied by wife

## 2017-05-24 NOTE — ED PROVIDER NOTE - PHYSICAL EXAMINATION
Physical Exam: elderly M in NAD, AAOx3, NCAT, MMM, neck is supple, PERRL, CTAB, normal rate and regular rhythm, abdomen is soft and NTND, No edema, No deformity of extremities, No rashes, CN II-XII intact, No focal motor or sensory deficits. No pronator drift, 5/5 strength ~ Nico Rosenberg MD

## 2017-05-24 NOTE — OCCUPATIONAL THERAPY INITIAL EVALUATION ADULT - ORIENTATION, REHAB EVAL
stated place as hospital in [place] however self corrected to [facility] Pt able to state day, month and year not date/oriented to person, place, time and situation

## 2017-05-24 NOTE — DISCHARGE NOTE ADULT - HOSPITAL COURSE
66 y.o. M with PMH of DVT with PE (previously on coumadin), Myasthenia Gravis ( on cellcept), obesity, p/w transfer from [place] where he presented with headache and nausea for CVT ( L transverse sinus thrombosis) noted on CT H and CTA H/N done showed high suspicion for L transverse sinus thrombosis with no associated hemorrhage or indication of venous infarct. Patient admitted to the stroke service where he was started on Heparin drip, later changed to Lovenox with no complications. MRI/MRV with findings of venous thrombosis involving the left internal jugular vein, sigmoid and transverse sinuses and the proximal right transverse sinus, torcula and small portion of the distal superior sagittal sinus. CT C/A/P done without evidence of mass or malignancy. Hypercoagulable panel sent, results pending, will be followed up as an outpatient.     Patient will be discharged on Eliquis 5 bid, advised to return to the emergency room for any changes and to stop Eliquis and go to emergency room for any bleeding or change in his mental status. Advised to follow up with his doctors. Medically stable for discharge.

## 2017-05-24 NOTE — PATIENT PROFILE ADULT. - ABILITY TO HEAR (WITH HEARING AID OR HEARING APPLIANCE IF NORMALLY USED):
Mildly to Moderately Impaired: difficulty hearing in some environments or speaker may need to increase volume or speak distinctly/baseline by patient and family

## 2017-05-24 NOTE — OCCUPATIONAL THERAPY INITIAL EVALUATION ADULT - PLANNED THERAPY INTERVENTIONS, OT EVAL
fine motor coordination training/balance training/transfer training/ADL retraining/strengthening/cognitive, visual perceptual/bed mobility training

## 2017-05-24 NOTE — DISCHARGE NOTE ADULT - HOME CARE AGENCY
[facility] [phone] FOR VN AND PT. VISITING NURSE WILL CALL TO SCHEDULE VISIT DAY AFTER DISCHARGE.

## 2017-05-24 NOTE — ED ADULT NURSE NOTE - OBJECTIVE STATEMENT
Pt presents to ED awake and alert, transferred from [facility] d/t Pt presents to ED awake and alert, transferred from [facility] d/t a venous thrombosis in his brain. Pt went to [place] ED c/o headache for 3 days that got worse and became "splitting" today. 30 min PTA pt had transient hearing loss and a code stroke was called at [place]. Pt reports his headache is now resolved but he is having severe nausea. According to EMS, pt got morphine and zofran prior to leaving. Pt has h/o myasthenia gravis and has been on Coumadin in the past d/t PE. Pt arrived with heparin infusing through a 20G in the L AC ar 2100 units/hr. No neuro deficits noted, pt is A&Ox3, no hearing difficulties. Pt presents to ED awake and alert, transferred from [facility] d/t a venous thrombosis in his brain. Pt went to [place] ED c/o headache for 3 days that got worse and became "splitting" today. 30 min PTA pt had transient hearing loss and a code stroke was called at [place]. Pt reports his headache is now resolved but he is having severe nausea. According to EMS, pt got morphine and zofran prior to leaving. Pt has h/o myasthenia gravis and has been on Coumadin in the past d/t PE. Pt arrived with heparin infusing through a 20G in the L AC ar 2100 units/hr. No neuro deficits noted, pt is A&Ox3, no hearing difficulties.  OK to continue heparin infusion at above rate as per Dr. Oneal. Second line placed on arrival.

## 2017-05-24 NOTE — H&P ADULT. - HISTORY OF PRESENT ILLNESS
Patient is a 66 y.o. M with PMH of DVT with PE (previously on coumadin), Myasthenia Gravis ( on cellcept), obesity, p/w transfer from [place] for CVT ( L transverse sinus thrombosis). As per notes and patient, today patient had acute headache associated with N/V and hearing loss, presented to ED at [place] where Code Stroke was called with last normal 45 minutes prior to arrival. CT H and CTA H/N done showed high suspicion for L transverse sinus thrombosis with no associated hemorrhage or indication of venous infarct. Patient was started on a heparin drip, symptomatically patient improved denied further headache and transferred to [facility] for further management.

## 2017-05-24 NOTE — H&P ADULT. - ATTENDING COMMENTS
The patient was seen and examined by me. Imaging (CT head, CTA) reviewed by me. This is a 66M with history of past DVT/PE in post-op period, and MG, who presents with several days of headache and intermittent confusion. Denies visual loss or double vision, N/V. He has a normal neurological examination except for significant bilateral hearing loss. CT shows probable thrombosis of the transverse and straight sinus. CTA shows no venous sinus filling. Plan is for full anticoagulation with Lovenox. Check brain MRI, MRV with contrast. Check LE duplex, CT C/A/P to r/o malignancy, and hypercoag panel for source as this is unusual in a patient this age. IVF hydration with NS at 100cc/hr. Neuro checks in the stroke unit. No rehab needs as he is at baseline.

## 2017-05-24 NOTE — OCCUPATIONAL THERAPY INITIAL EVALUATION ADULT - PERTINENT HX OF CURRENT PROBLEM, REHAB EVAL
66 y.o. M with PMH of DVT with PE , Myasthenia Gravis, obesity, p/w transfer from [place] for CVT . As per notes and patient, today patient had acute headache associated with N/V and hearing loss, presented to ED at [place] where Code Stroke was called with last normal 45 minutes prior to arrival... see below

## 2017-05-24 NOTE — ED ADULT TRIAGE NOTE - ARRIVAL INFO ADDITIONAL COMMENTS
BIBA from [place] pt has cerebral sinus thrombus, reporting HA and Nausea improved as he was medication upon arrival of EMS for transport

## 2017-05-24 NOTE — DISCHARGE NOTE ADULT - PLAN OF CARE
prevent recurrence take medications as indicated  return to the hospital or call your physician immediately for any new symptoms   follow up with Dr. Alston

## 2017-05-24 NOTE — OCCUPATIONAL THERAPY INITIAL EVALUATION ADULT - MD ORDER
OT Evaluation   Ambulate with Assistance OT Evaluation   Ambulate with Assistance  Pt [place]- no hearing aids

## 2017-05-24 NOTE — DIETITIAN INITIAL EVALUATION ADULT. - NS AS NUTRI INTERV ED CONTENT
Discussed diabetes meal planning, portion control, healthful food choices, balanced eating, need for lean protein intake compared to CHO intake, self monitoring./Recommended modifications/Purpose of the nutrition education

## 2017-05-24 NOTE — DISCHARGE NOTE ADULT - OTHER SIGNIFICANT FINDINGS
MRI/MRV Brain:   No acute or subacute infarction.  Mild to moderate severity microvascular ischemic change. Venous thrombosis  involving the left internal jugular vein, sigmoid and transverse sinuses and  the proximal right transverse sinus, torcula and small portion of the distal  superior sagittal sinus.  CT C/A/P:   1. No evidence of malignancy in the chest, abdomen or pelvis.  2. Large lower abdominal wall hernia containing nonobstructed small bowel  and colon.

## 2017-05-24 NOTE — DISCHARGE NOTE ADULT - CARE PROVIDER_API CALL
Case Ibrahim (PEGGY), Neurology; Vascular Neurology  [address] [place] [address]  Phone: [phone]  Fax: [phone]

## 2017-05-24 NOTE — DISCHARGE NOTE ADULT - PATIENT PORTAL LINK FT
“You can access the Follow[employer] Patient Portal, offered by [facility], by registering with the following website: [email or web address]”

## 2017-05-24 NOTE — H&P ADULT. - PROBLEM SELECTOR PLAN 1
CT H/CTA H/N from [place] with likely L transverse sinus thrombosis  CTV in ED  MRV  MRI Brain w/o contrast  TTE  Heparin drip no bolus, goal 55-60 PTT  Neurochecks q2h  PT/OT/Tele

## 2017-05-24 NOTE — DISCHARGE NOTE ADULT - NS AS DC STROKE ED MATERIALS
Prescribed Medications/Need for Followup After Discharge/Stroke Warning Signs and Symptoms/Risk Factors for Stroke/Stroke Education Booklet/Call 911 for Stroke

## 2017-05-25 VITALS — DIASTOLIC BLOOD PRESSURE: 82 MMHG | SYSTOLIC BLOOD PRESSURE: 134 MMHG | OXYGEN SATURATION: 96 % | HEART RATE: 60 BPM

## 2017-05-25 LAB
ANION GAP SERPL CALC-SCNC: 12 MMOL/L — SIGNIFICANT CHANGE UP (ref 5–17)
AT III ACT/NOR PPP CHRO: 86 % — SIGNIFICANT CHANGE UP (ref 85–135)
B2 GLYCOPROT1 AB SER QL: NEGATIVE — SIGNIFICANT CHANGE UP
BUN SERPL-MCNC: 16 MG/DL — SIGNIFICANT CHANGE UP (ref 7–23)
CALCIUM SERPL-MCNC: 9 MG/DL — SIGNIFICANT CHANGE UP (ref 8.4–10.5)
CARDIOLIPIN AB SER-ACNC: NEGATIVE — SIGNIFICANT CHANGE UP
CHLORIDE SERPL-SCNC: 100 MMOL/L — SIGNIFICANT CHANGE UP (ref 96–108)
CO2 SERPL-SCNC: 25 MMOL/L — SIGNIFICANT CHANGE UP (ref 22–31)
CREAT SERPL-MCNC: 0.81 MG/DL — SIGNIFICANT CHANGE UP (ref 0.5–1.3)
DRVVT SCREEN TO CONFIRM RATIO: SIGNIFICANT CHANGE UP
DRVVT SCREEN TO CONFIRM RATIO: SIGNIFICANT CHANGE UP
GLUCOSE SERPL-MCNC: 125 MG/DL — HIGH (ref 70–99)
HCT VFR BLD CALC: 42.4 % — SIGNIFICANT CHANGE UP (ref 39–50)
HGB BLD-MCNC: 14 G/DL — SIGNIFICANT CHANGE UP (ref 13–17)
INR BLD: 1.11 RATIO — SIGNIFICANT CHANGE UP (ref 0.88–1.16)
LA NT DPL PPP QL: 37.1 SEC — SIGNIFICANT CHANGE UP
LA NT DPL PPP QL: 40 SEC — SIGNIFICANT CHANGE UP
MCHC RBC-ENTMCNC: 29.6 PG — SIGNIFICANT CHANGE UP (ref 27–34)
MCHC RBC-ENTMCNC: 33.1 GM/DL — SIGNIFICANT CHANGE UP (ref 32–36)
MCV RBC AUTO: 89.5 FL — SIGNIFICANT CHANGE UP (ref 80–100)
PLATELET # BLD AUTO: 185 K/UL — SIGNIFICANT CHANGE UP (ref 150–400)
POTASSIUM SERPL-MCNC: 3.9 MMOL/L — SIGNIFICANT CHANGE UP (ref 3.5–5.3)
POTASSIUM SERPL-SCNC: 3.9 MMOL/L — SIGNIFICANT CHANGE UP (ref 3.5–5.3)
PROT C ACT/NOR PPP: 111 % — SIGNIFICANT CHANGE UP (ref 74–150)
PROT S FREE AG PPP IA-ACNC: 107 % — SIGNIFICANT CHANGE UP (ref 67–141)
PROTHROM AB SERPL-ACNC: 12.6 SEC — SIGNIFICANT CHANGE UP (ref 10–13.1)
RBC # BLD: 4.74 M/UL — SIGNIFICANT CHANGE UP (ref 4.2–5.8)
RBC # FLD: 12.6 % — SIGNIFICANT CHANGE UP (ref 10.3–14.5)
SODIUM SERPL-SCNC: 137 MMOL/L — SIGNIFICANT CHANGE UP (ref 135–145)
WBC # BLD: 9.1 K/UL — SIGNIFICANT CHANGE UP (ref 3.8–10.5)
WBC # FLD AUTO: 9.1 K/UL — SIGNIFICANT CHANGE UP (ref 3.8–10.5)

## 2017-05-25 PROCEDURE — 97166 OT EVAL MOD COMPLEX 45 MIN: CPT

## 2017-05-25 PROCEDURE — 85613 RUSSELL VIPER VENOM DILUTED: CPT

## 2017-05-25 PROCEDURE — 80048 BASIC METABOLIC PNL TOTAL CA: CPT

## 2017-05-25 PROCEDURE — G0515: CPT

## 2017-05-25 PROCEDURE — 85610 PROTHROMBIN TIME: CPT

## 2017-05-25 PROCEDURE — 97161 PT EVAL LOW COMPLEX 20 MIN: CPT

## 2017-05-25 PROCEDURE — 83090 ASSAY OF HOMOCYSTEINE: CPT

## 2017-05-25 PROCEDURE — 93970 EXTREMITY STUDY: CPT | Mod: 26

## 2017-05-25 PROCEDURE — 86147 CARDIOLIPIN ANTIBODY EA IG: CPT

## 2017-05-25 PROCEDURE — 71260 CT THORAX DX C+: CPT

## 2017-05-25 PROCEDURE — 85730 THROMBOPLASTIN TIME PARTIAL: CPT

## 2017-05-25 PROCEDURE — 85306 CLOT INHIBIT PROT S FREE: CPT

## 2017-05-25 PROCEDURE — 93970 EXTREMITY STUDY: CPT

## 2017-05-25 PROCEDURE — 94150 VITAL CAPACITY TEST: CPT

## 2017-05-25 PROCEDURE — 81291 MTHFR GENE: CPT

## 2017-05-25 PROCEDURE — 96374 THER/PROPH/DIAG INJ IV PUSH: CPT

## 2017-05-25 PROCEDURE — 83036 HEMOGLOBIN GLYCOSYLATED A1C: CPT

## 2017-05-25 PROCEDURE — 99233 SBSQ HOSP IP/OBS HIGH 50: CPT

## 2017-05-25 PROCEDURE — 85307 ASSAY ACTIVATED PROTEIN C: CPT

## 2017-05-25 PROCEDURE — 85303 CLOT INHIBIT PROT C ACTIVITY: CPT

## 2017-05-25 PROCEDURE — 81240 F2 GENE: CPT

## 2017-05-25 PROCEDURE — 96375 TX/PRO/DX INJ NEW DRUG ADDON: CPT

## 2017-05-25 PROCEDURE — 70546 MR ANGIOGRAPH HEAD W/O&W/DYE: CPT

## 2017-05-25 PROCEDURE — 81241 F5 GENE: CPT

## 2017-05-25 PROCEDURE — A9585: CPT

## 2017-05-25 PROCEDURE — 70450 CT HEAD/BRAIN W/O DYE: CPT

## 2017-05-25 PROCEDURE — 74177 CT ABD & PELVIS W/CONTRAST: CPT

## 2017-05-25 PROCEDURE — 99285 EMERGENCY DEPT VISIT HI MDM: CPT | Mod: 25

## 2017-05-25 PROCEDURE — 85027 COMPLETE CBC AUTOMATED: CPT

## 2017-05-25 PROCEDURE — 86146 BETA-2 GLYCOPROTEIN ANTIBODY: CPT

## 2017-05-25 PROCEDURE — 85300 ANTITHROMBIN III ACTIVITY: CPT

## 2017-05-25 PROCEDURE — 82553 CREATINE MB FRACTION: CPT

## 2017-05-25 PROCEDURE — 70553 MRI BRAIN STEM W/O & W/DYE: CPT

## 2017-05-25 PROCEDURE — 80061 LIPID PANEL: CPT

## 2017-05-25 PROCEDURE — 84484 ASSAY OF TROPONIN QUANT: CPT

## 2017-05-25 RX ORDER — APIXABAN 2.5 MG/1
5 TABLET, FILM COATED ORAL
Qty: 0 | Refills: 0 | Status: DISCONTINUED | OUTPATIENT
Start: 2017-05-25 | End: 2017-05-25

## 2017-05-25 RX ORDER — APIXABAN 2.5 MG/1
1 TABLET, FILM COATED ORAL
Qty: 0 | Refills: 0 | DISCHARGE
Start: 2017-05-25

## 2017-05-25 RX ORDER — APIXABAN 2.5 MG/1
1 TABLET, FILM COATED ORAL
Qty: 60 | Refills: 0 | OUTPATIENT
Start: 2017-05-25 | End: 2017-06-24

## 2017-05-25 RX ADMIN — APIXABAN 5 MILLIGRAM(S): 2.5 TABLET, FILM COATED ORAL at 16:57

## 2017-05-25 RX ADMIN — MYCOPHENOLATE MOFETIL 500 MILLIGRAM(S): 250 CAPSULE ORAL at 05:28

## 2017-05-25 RX ADMIN — MYCOPHENOLATE MOFETIL 500 MILLIGRAM(S): 250 CAPSULE ORAL at 16:57

## 2017-05-25 RX ADMIN — ENOXAPARIN SODIUM 120 MILLIGRAM(S): 100 INJECTION SUBCUTANEOUS at 05:28

## 2017-05-25 RX ADMIN — Medication 650 MILLIGRAM(S): at 08:45

## 2017-05-25 RX ADMIN — Medication 650 MILLIGRAM(S): at 16:33

## 2017-05-25 NOTE — PHYSICAL THERAPY INITIAL EVALUATION ADULT - PREDICTED DURATION OF THERAPY (DAYS/WKS), PT EVAL
Patient is currently functioning at baseline (independent) and will be D/C from PT program at this time.

## 2017-05-25 NOTE — PHYSICAL THERAPY INITIAL EVALUATION ADULT - RANGE OF MOTION EXAMINATION, REHAB EVAL
BLE hip flexion limited 2* hernia/bilateral upper extremity ROM was WFL (within functional limits)/bilateral lower extremity ROM was WFL (within functional limits)

## 2017-05-25 NOTE — PHYSICAL THERAPY INITIAL EVALUATION ADULT - REFERRING PHYSICIAN, REHAB EVAL
Mary Wood. Consult- PT evaluate and treat, Activity- OOB with assist, Activity- Ambulate with assist

## 2017-05-25 NOTE — PHYSICAL THERAPY INITIAL EVALUATION ADULT - LIVES WITH, PROFILE
Pt reports he lives with wife in  with 3 steps to enter, PTA pt was independent with all functional mobility & able to ambulate without AD./spouse

## 2017-05-25 NOTE — PHYSICAL THERAPY INITIAL EVALUATION ADULT - PERTINENT HX OF CURRENT PROBLEM, REHAB EVAL
Patient is a 66 y.o. M with PMH of DVT with PE (previously on coumadin), Myasthenia Gravis ( on cellcept), obesity, p/w transfer from [place] for CVT ( L transverse sinus thrombosis). Patient currently on heparin drip, unknown etiology for CVT. Patient with h/o DVT complicated by PE in past, r/o hypercoagulable  causes.

## 2017-05-26 LAB — APCR PPP: 3 RATIO — SIGNIFICANT CHANGE UP

## 2017-05-30 LAB
DNA PLOIDY SPEC FC-IMP: SIGNIFICANT CHANGE UP
MTHFR GENE INTERPRETATION: SIGNIFICANT CHANGE UP
PTR INTERPRETATION: SIGNIFICANT CHANGE UP

## 2017-06-02 ENCOUNTER — APPOINTMENT (OUTPATIENT)
Dept: NEUROLOGY | Facility: CLINIC | Age: 67
End: 2017-06-02

## 2017-06-02 VITALS
SYSTOLIC BLOOD PRESSURE: 138 MMHG | TEMPERATURE: 97.7 F | WEIGHT: 260 LBS | HEART RATE: 70 BPM | HEIGHT: 67 IN | DIASTOLIC BLOOD PRESSURE: 84 MMHG | OXYGEN SATURATION: 96 % | BODY MASS INDEX: 40.81 KG/M2

## 2017-06-02 DIAGNOSIS — Z86.718 PERSONAL HISTORY OF OTHER VENOUS THROMBOSIS AND EMBOLISM: ICD-10-CM

## 2017-06-02 DIAGNOSIS — Z87.891 PERSONAL HISTORY OF NICOTINE DEPENDENCE: ICD-10-CM

## 2017-06-02 DIAGNOSIS — Z78.9 OTHER SPECIFIED HEALTH STATUS: ICD-10-CM

## 2017-06-02 DIAGNOSIS — Z86.711 PERSONAL HISTORY OF PULMONARY EMBOLISM: ICD-10-CM

## 2017-06-02 RX ORDER — MYCOPHENOLATE MOFETIL 500 MG/1
500 TABLET, FILM COATED ORAL TWICE DAILY
Refills: 0 | Status: ACTIVE | COMMUNITY

## 2017-06-20 ENCOUNTER — MEDICATION RENEWAL (OUTPATIENT)
Age: 67
End: 2017-06-20

## 2017-09-12 ENCOUNTER — APPOINTMENT (OUTPATIENT)
Dept: NEUROLOGY | Facility: CLINIC | Age: 67
End: 2017-09-12

## 2017-10-18 ENCOUNTER — MEDICATION RENEWAL (OUTPATIENT)
Age: 67
End: 2017-10-18

## 2017-10-18 RX ORDER — APIXABAN 5 MG/1
5 TABLET, FILM COATED ORAL
Qty: 60 | Refills: 6 | Status: ACTIVE | COMMUNITY
Start: 1900-01-01 | End: 1900-01-01

## 2018-02-15 NOTE — OCCUPATIONAL THERAPY INITIAL EVALUATION ADULT - FINE MOTOR COORDINATION, LEFT HAND, FINGER TO NOSE, OT EVAL
Helena Eldridge    Enclosed are your results.  Your labs are normal/stable at this time.    The MCV is slightly elevated.   Please review this with Dr Hsieh.   Typically one might check some B-Vitamin levels with this finding.  I am quite sure he will be getting more blood tests during your evaluation and he may want to check into this as well.      Sincerely,     SUSHMA NELSON M.D.     Please call  with any questions.  We can also discuss any questions regarding these labs at your next scheduled visit.    Sincerely,    Sushma Nelson M.D.   mild impairment

## 2018-07-22 PROBLEM — Z78.9 ALCOHOL USE: Status: INACTIVE | Noted: 2017-06-02

## 2019-05-05 PROBLEM — G70.00 MYASTHENIA GRAVIS WITHOUT (ACUTE) EXACERBATION: Chronic | Status: ACTIVE | Noted: 2017-05-23

## 2019-05-12 ENCOUNTER — TRANSCRIPTION ENCOUNTER (OUTPATIENT)
Age: 69
End: 2019-05-12

## 2019-05-13 ENCOUNTER — OUTPATIENT (OUTPATIENT)
Dept: OUTPATIENT SERVICES | Facility: HOSPITAL | Age: 69
LOS: 1 days | Discharge: HOME | End: 2019-05-13
Payer: MEDICARE

## 2019-05-13 VITALS
DIASTOLIC BLOOD PRESSURE: 59 MMHG | HEART RATE: 58 BPM | OXYGEN SATURATION: 98 % | HEIGHT: 67 IN | RESPIRATION RATE: 18 BRPM | SYSTOLIC BLOOD PRESSURE: 118 MMHG | TEMPERATURE: 98 F | WEIGHT: 250 LBS

## 2019-05-13 VITALS
DIASTOLIC BLOOD PRESSURE: 64 MMHG | RESPIRATION RATE: 16 BRPM | HEART RATE: 60 BPM | SYSTOLIC BLOOD PRESSURE: 112 MMHG | OXYGEN SATURATION: 97 %

## 2019-05-13 DIAGNOSIS — M19.072 PRIMARY OSTEOARTHRITIS, LEFT ANKLE AND FOOT: ICD-10-CM

## 2019-05-13 DIAGNOSIS — M54.9 DORSALGIA, UNSPECIFIED: Chronic | ICD-10-CM

## 2019-05-13 DIAGNOSIS — J36 PERITONSILLAR ABSCESS: Chronic | ICD-10-CM

## 2019-05-13 DIAGNOSIS — K37 UNSPECIFIED APPENDICITIS: Chronic | ICD-10-CM

## 2019-05-13 DIAGNOSIS — K63.9 DISEASE OF INTESTINE, UNSPECIFIED: Chronic | ICD-10-CM

## 2019-05-13 DIAGNOSIS — M20.22 HALLUX RIGIDUS, LEFT FOOT: ICD-10-CM

## 2019-05-13 DIAGNOSIS — K81.9 CHOLECYSTITIS, UNSPECIFIED: Chronic | ICD-10-CM

## 2019-05-13 LAB — GLUCOSE BLDC GLUCOMTR-MCNC: 90 MG/DL — SIGNIFICANT CHANGE UP (ref 70–99)

## 2019-05-13 PROCEDURE — 93010 ELECTROCARDIOGRAM REPORT: CPT

## 2019-05-13 RX ORDER — HYDROMORPHONE HYDROCHLORIDE 2 MG/ML
0.5 INJECTION INTRAMUSCULAR; INTRAVENOUS; SUBCUTANEOUS
Refills: 0 | Status: DISCONTINUED | OUTPATIENT
Start: 2019-05-13 | End: 2019-05-14

## 2019-05-13 RX ORDER — SODIUM CHLORIDE 9 MG/ML
1000 INJECTION, SOLUTION INTRAVENOUS
Refills: 0 | Status: DISCONTINUED | OUTPATIENT
Start: 2019-05-13 | End: 2019-05-14

## 2019-05-13 RX ORDER — ACETAMINOPHEN 500 MG
1000 TABLET ORAL ONCE
Refills: 0 | Status: COMPLETED | OUTPATIENT
Start: 2019-05-13 | End: 2019-05-13

## 2019-05-13 RX ORDER — OXYCODONE HYDROCHLORIDE 5 MG/1
5 TABLET ORAL ONCE
Refills: 0 | Status: DISCONTINUED | OUTPATIENT
Start: 2019-05-13 | End: 2019-05-14

## 2019-05-13 RX ORDER — ONDANSETRON 8 MG/1
4 TABLET, FILM COATED ORAL ONCE
Refills: 0 | Status: DISCONTINUED | OUTPATIENT
Start: 2019-05-13 | End: 2019-05-14

## 2019-05-13 RX ADMIN — Medication 1000 MILLIGRAM(S): at 20:15

## 2019-05-13 RX ADMIN — SODIUM CHLORIDE 50 MILLILITER(S): 9 INJECTION, SOLUTION INTRAVENOUS at 19:56

## 2019-05-13 RX ADMIN — Medication 400 MILLIGRAM(S): at 19:59

## 2019-05-13 NOTE — H&P PST ADULT - ASSESSMENT
Pt to have left foot surgery today, was cleared by Dr. Tommy COOPER hematologically cleared and holding eliquis 48hrs prior to surgery and will resume 24hrs after surgery. Left foot fusion 1st mtpj.

## 2019-05-13 NOTE — ASU PATIENT PROFILE, ADULT - PSH
Acute back pain  back surgery times 4  Appendicitis  appendectomy  Cholecystitis  cholecystectomy  Colon abnormality  colon resection  Tonsillar abscess  tonsillectomy

## 2019-05-13 NOTE — ASU DISCHARGE PLAN (ADULT/PEDIATRIC) - CALL YOUR DOCTOR IF YOU HAVE ANY OF THE FOLLOWING:
Numbness, tingling, color or temperature change to extremity/Pain not relieved by Medications/Fever greater than (need to indicate Fahrenheit or Celsius)/Nausea and vomiting that does not stop/Swelling that gets worse/Bleeding that does not stop/Wound/Surgical Site with redness, or foul smelling discharge or pus
- - -

## 2019-05-13 NOTE — ASU PATIENT PROFILE, ADULT - ABILITY TO HEAR (WITH HEARING AID OR HEARING APPLIANCE IF NORMALLY USED):
Mildly to Moderately Impaired: difficulty hearing in some environments or speaker may need to increase volume or speak distinctly/hearing aid at home

## 2019-05-13 NOTE — H&P PST ADULT - HISTORY OF PRESENT ILLNESS
Pt is here for left foot big toe joint surgery for arthritis, 1st mtpj fusion. Pt with history of PE, phlebitis, LS laminectomy, diverticulitis, appendectomy, cholecystectomy, DM, GERD, cerebral venous thrombosis, occlusion/stenosis bilateral carotid artery, myasthenia gravis, carpal tunnel, cervical radiculopathy. Had report no bleeding, no SOB, no calf swelling.

## 2019-05-17 DIAGNOSIS — Z86.711 PERSONAL HISTORY OF PULMONARY EMBOLISM: ICD-10-CM

## 2019-05-17 DIAGNOSIS — M20.22 HALLUX RIGIDUS, LEFT FOOT: ICD-10-CM

## 2020-01-20 ENCOUNTER — NON-APPOINTMENT (OUTPATIENT)
Age: 70
End: 2020-01-20

## 2020-01-20 ENCOUNTER — APPOINTMENT (OUTPATIENT)
Dept: FAMILY MEDICINE | Facility: CLINIC | Age: 70
End: 2020-01-20
Payer: MEDICARE

## 2020-01-20 VITALS
HEIGHT: 67 IN | HEART RATE: 63 BPM | BODY MASS INDEX: 39.24 KG/M2 | SYSTOLIC BLOOD PRESSURE: 92 MMHG | DIASTOLIC BLOOD PRESSURE: 60 MMHG | TEMPERATURE: 97.4 F | WEIGHT: 250 LBS | OXYGEN SATURATION: 96 %

## 2020-01-20 DIAGNOSIS — Z23 ENCOUNTER FOR IMMUNIZATION: ICD-10-CM

## 2020-01-20 PROCEDURE — G0008: CPT

## 2020-01-20 PROCEDURE — 99203 OFFICE O/P NEW LOW 30 MIN: CPT | Mod: 25

## 2020-01-20 PROCEDURE — 90686 IIV4 VACC NO PRSV 0.5 ML IM: CPT

## 2020-01-23 ENCOUNTER — APPOINTMENT (OUTPATIENT)
Dept: INTERNAL MEDICINE | Facility: CLINIC | Age: 70
End: 2020-01-23

## 2020-06-15 ENCOUNTER — APPOINTMENT (OUTPATIENT)
Dept: FAMILY MEDICINE | Facility: CLINIC | Age: 70
End: 2020-06-15
Payer: MEDICARE

## 2020-06-15 VITALS
RESPIRATION RATE: 20 BRPM | BODY MASS INDEX: 39.16 KG/M2 | HEART RATE: 63 BPM | OXYGEN SATURATION: 97 % | SYSTOLIC BLOOD PRESSURE: 118 MMHG | WEIGHT: 250 LBS | DIASTOLIC BLOOD PRESSURE: 72 MMHG

## 2020-06-15 PROCEDURE — 99214 OFFICE O/P EST MOD 30 MIN: CPT

## 2021-01-28 ENCOUNTER — APPOINTMENT (OUTPATIENT)
Dept: INTERNAL MEDICINE | Facility: CLINIC | Age: 71
End: 2021-01-28
Payer: MEDICARE

## 2021-01-28 VITALS
WEIGHT: 250 LBS | HEART RATE: 76 BPM | HEIGHT: 67 IN | DIASTOLIC BLOOD PRESSURE: 74 MMHG | SYSTOLIC BLOOD PRESSURE: 116 MMHG | BODY MASS INDEX: 39.24 KG/M2

## 2021-01-28 DIAGNOSIS — Z12.11 ENCOUNTER FOR SCREENING FOR MALIGNANT NEOPLASM OF COLON: ICD-10-CM

## 2021-01-28 DIAGNOSIS — Z12.12 ENCOUNTER FOR SCREENING FOR MALIGNANT NEOPLASM OF COLON: ICD-10-CM

## 2021-01-28 DIAGNOSIS — G08 INTRACRANIAL AND INTRASPINAL PHLEBITIS AND THROMBOPHLEBITIS: ICD-10-CM

## 2021-01-28 PROCEDURE — 99072 ADDL SUPL MATRL&STAF TM PHE: CPT

## 2021-01-28 PROCEDURE — 99204 OFFICE O/P NEW MOD 45 MIN: CPT

## 2021-01-28 NOTE — PHYSICAL EXAM
[General Appearance - Alert] : alert [General Appearance - In No Acute Distress] : in no acute distress [Sclera] : the sclera and conjunctiva were normal [PERRL With Normal Accommodation] : pupils were equal in size, round, and reactive to light [Extraocular Movements] : extraocular movements were intact [Outer Ear] : the ears and nose were normal in appearance [Oropharynx] : the oropharynx was normal [Neck Appearance] : the appearance of the neck was normal [Neck Cervical Mass (___cm)] : no neck mass was observed [Jugular Venous Distention Increased] : there was no jugular-venous distention [Thyroid Diffuse Enlargement] : the thyroid was not enlarged [Thyroid Nodule] : there were no palpable thyroid nodules [Auscultation Breath Sounds / Voice Sounds] : lungs were clear to auscultation bilaterally [Heart Rate And Rhythm] : heart rate was normal and rhythm regular [Heart Sounds Gallop] : no gallops [Heart Sounds] : normal S1 and S2 [Murmurs] : no murmurs [Heart Sounds Pericardial Friction Rub] : no pericardial rub [Bowel Sounds] : normal bowel sounds [Abdomen Soft] : soft [Abdomen Tenderness] : non-tender [Abdomen Mass (___ Cm)] : no abdominal mass palpated [Cervical Lymph Nodes Enlarged Posterior Bilaterally] : posterior cervical [Cervical Lymph Nodes Enlarged Anterior Bilaterally] : anterior cervical [Supraclavicular Lymph Nodes Enlarged Bilaterally] : supraclavicular [Axillary Lymph Nodes Enlarged Bilaterally] : axillary [Femoral Lymph Nodes Enlarged Bilaterally] : femoral [Inguinal Lymph Nodes Enlarged Bilaterally] : inguinal [Nail Clubbing] : no clubbing  or cyanosis of the fingernails [Abnormal Walk] : normal gait [Musculoskeletal - Swelling] : no joint swelling seen [Motor Tone] : muscle strength and tone were normal [Skin Color & Pigmentation] : normal skin color and pigmentation [Skin Turgor] : normal skin turgor [] : no rash [Deep Tendon Reflexes (DTR)] : deep tendon reflexes were 2+ and symmetric [Sensation] : the sensory exam was normal to light touch and pinprick [No Focal Deficits] : no focal deficits [Oriented To Time, Place, And Person] : oriented to person, place, and time [Impaired Insight] : insight and judgment were intact [Affect] : the affect was normal

## 2021-01-28 NOTE — HISTORY OF PRESENT ILLNESS
[FreeTextEntry1] : Here to schedule colonoscopy. He has h/o colon polyps in the past . Last colonoscopy long time ago. hE HAS A H/o myasthenia gravis and sees Dr. Vann. He is also on Eliquis- due to venous thrombobosis. Sees Dr. Sanders in [place] he also has h/o Pulmonary embolism and DVT.

## 2021-01-28 NOTE — ASSESSMENT
[FreeTextEntry1] : Colonoscopy full risk and benefits explained\par High risk due to his concurring problems- will need neuro and hematology clearance.

## 2021-02-11 NOTE — ASU PATIENT PROFILE, ADULT - URINARY CATHETER
no Partial Purse String (Intermediate) Text: Given the location of the defect and the characteristics of the surrounding skin an intermediate purse string closure was deemed most appropriate.  Undermining was performed circumferentially around the surgical defect.  A purse string suture was then placed and tightened. Wound tension of the circular defect prevented complete closure of the wound.

## 2021-02-18 ENCOUNTER — APPOINTMENT (OUTPATIENT)
Dept: FAMILY MEDICINE | Facility: CLINIC | Age: 71
End: 2021-02-18
Payer: MEDICARE

## 2021-02-18 VITALS
HEART RATE: 85 BPM | WEIGHT: 241 LBS | TEMPERATURE: 97.3 F | BODY MASS INDEX: 37.75 KG/M2 | OXYGEN SATURATION: 97 % | SYSTOLIC BLOOD PRESSURE: 122 MMHG | DIASTOLIC BLOOD PRESSURE: 78 MMHG

## 2021-02-18 LAB
25(OH)D3 SERPL-MCNC: 23.3 NG/ML
ALBUMIN SERPL ELPH-MCNC: 4.4 G/DL
ALP BLD-CCNC: 72 U/L
ALT SERPL-CCNC: 7 U/L
ANION GAP SERPL CALC-SCNC: 13 MMOL/L
AST SERPL-CCNC: 8 U/L
BILIRUB SERPL-MCNC: 0.6 MG/DL
BUN SERPL-MCNC: 24 MG/DL
CALCIUM SERPL-MCNC: 9.4 MG/DL
CHLORIDE SERPL-SCNC: 101 MMOL/L
CHOLEST SERPL-MCNC: 165 MG/DL
CO2 SERPL-SCNC: 24 MMOL/L
CREAT SERPL-MCNC: 0.99 MG/DL
GLUCOSE SERPL-MCNC: 186 MG/DL
HDLC SERPL-MCNC: 41 MG/DL
LDLC SERPL CALC-MCNC: 107 MG/DL
NONHDLC SERPL-MCNC: 124 MG/DL
POTASSIUM SERPL-SCNC: 5.1 MMOL/L
PROT SERPL-MCNC: 7.2 G/DL
PSA SERPL-MCNC: 0.3 NG/ML
SARS-COV-2 IGG SERPL IA-ACNC: 0.07 INDEX
SARS-COV-2 IGG SERPL QL IA: NEGATIVE
SODIUM SERPL-SCNC: 138 MMOL/L
TRIGL SERPL-MCNC: 84 MG/DL
TSH SERPL-ACNC: 2.11 UIU/ML

## 2021-02-18 PROCEDURE — G0438: CPT

## 2021-02-18 PROCEDURE — 99072 ADDL SUPL MATRL&STAF TM PHE: CPT

## 2021-02-18 PROCEDURE — 36415 COLL VENOUS BLD VENIPUNCTURE: CPT

## 2021-02-19 LAB
BASOPHILS # BLD AUTO: 0.05 K/UL
BASOPHILS NFR BLD AUTO: 0.7 %
EOSINOPHIL # BLD AUTO: 0.08 K/UL
EOSINOPHIL NFR BLD AUTO: 1.1 %
ESTIMATED AVERAGE GLUCOSE: 166 MG/DL
HBA1C MFR BLD HPLC: 7.4 %
HCT VFR BLD CALC: 47.6 %
HGB BLD-MCNC: 15.2 G/DL
IMM GRANULOCYTES NFR BLD AUTO: 0.3 %
LYMPHOCYTES # BLD AUTO: 1.66 K/UL
LYMPHOCYTES NFR BLD AUTO: 22.4 %
MAN DIFF?: NORMAL
MCHC RBC-ENTMCNC: 29.2 PG
MCHC RBC-ENTMCNC: 31.9 GM/DL
MCV RBC AUTO: 91.5 FL
MONOCYTES # BLD AUTO: 0.61 K/UL
MONOCYTES NFR BLD AUTO: 8.2 %
NEUTROPHILS # BLD AUTO: 4.99 K/UL
NEUTROPHILS NFR BLD AUTO: 67.3 %
PLATELET # BLD AUTO: 252 K/UL
RBC # BLD: 5.2 M/UL
RBC # FLD: 13.3 %
WBC # FLD AUTO: 7.41 K/UL

## 2021-03-22 NOTE — PATIENT PROFILE ADULT. - NS TRANSFER PATIENT BELONGINGS
Duration Of Freeze Thaw-Cycle (Seconds): 2 Render Post-Care Instructions In Note?: no Jewelry/Money (specify)/ringx1 Consent: The patient's consent was obtained including but not limited to risks of crusting, scabbing, blistering, scarring, darker or lighter pigmentary change, recurrence, incomplete removal and infection. Detail Level: Simple Number Of Freeze-Thaw Cycles: 1 freeze-thaw cycle Post-Care Instructions: I reviewed with the patient in detail post-care instructions. Patient is to wear sunprotection, and avoid picking at any of the treated lesions. Pt may apply Vaseline to crusted or scabbing areas.

## 2021-06-07 ENCOUNTER — OUTPATIENT (OUTPATIENT)
Dept: OUTPATIENT SERVICES | Facility: HOSPITAL | Age: 71
LOS: 1 days | End: 2021-06-07
Payer: MEDICARE

## 2021-06-07 VITALS
HEIGHT: 67 IN | SYSTOLIC BLOOD PRESSURE: 124 MMHG | HEART RATE: 61 BPM | TEMPERATURE: 97 F | OXYGEN SATURATION: 98 % | DIASTOLIC BLOOD PRESSURE: 79 MMHG | WEIGHT: 235.89 LBS | RESPIRATION RATE: 16 BRPM

## 2021-06-07 DIAGNOSIS — Z12.11 ENCOUNTER FOR SCREENING FOR MALIGNANT NEOPLASM OF COLON: ICD-10-CM

## 2021-06-07 DIAGNOSIS — J36 PERITONSILLAR ABSCESS: Chronic | ICD-10-CM

## 2021-06-07 DIAGNOSIS — N20.0 CALCULUS OF KIDNEY: Chronic | ICD-10-CM

## 2021-06-07 DIAGNOSIS — M54.9 DORSALGIA, UNSPECIFIED: Chronic | ICD-10-CM

## 2021-06-07 DIAGNOSIS — K37 UNSPECIFIED APPENDICITIS: Chronic | ICD-10-CM

## 2021-06-07 DIAGNOSIS — K81.9 CHOLECYSTITIS, UNSPECIFIED: Chronic | ICD-10-CM

## 2021-06-07 DIAGNOSIS — Z98.890 OTHER SPECIFIED POSTPROCEDURAL STATES: Chronic | ICD-10-CM

## 2021-06-07 DIAGNOSIS — K63.9 DISEASE OF INTESTINE, UNSPECIFIED: Chronic | ICD-10-CM

## 2021-06-07 LAB
ANION GAP SERPL CALC-SCNC: 14 MMOL/L — SIGNIFICANT CHANGE UP (ref 7–14)
BUN SERPL-MCNC: 22 MG/DL — SIGNIFICANT CHANGE UP (ref 7–23)
CALCIUM SERPL-MCNC: 9.4 MG/DL — SIGNIFICANT CHANGE UP (ref 8.4–10.5)
CHLORIDE SERPL-SCNC: 102 MMOL/L — SIGNIFICANT CHANGE UP (ref 98–107)
CO2 SERPL-SCNC: 24 MMOL/L — SIGNIFICANT CHANGE UP (ref 22–31)
CREAT SERPL-MCNC: 0.89 MG/DL — SIGNIFICANT CHANGE UP (ref 0.5–1.3)
GLUCOSE SERPL-MCNC: 154 MG/DL — HIGH (ref 70–99)
HCT VFR BLD CALC: 45.6 % — SIGNIFICANT CHANGE UP (ref 39–50)
HGB BLD-MCNC: 14.4 G/DL — SIGNIFICANT CHANGE UP (ref 13–17)
MCHC RBC-ENTMCNC: 28.8 PG — SIGNIFICANT CHANGE UP (ref 27–34)
MCHC RBC-ENTMCNC: 31.6 GM/DL — LOW (ref 32–36)
MCV RBC AUTO: 91.2 FL — SIGNIFICANT CHANGE UP (ref 80–100)
NRBC # BLD: 0 /100 WBCS — SIGNIFICANT CHANGE UP
NRBC # FLD: 0 K/UL — SIGNIFICANT CHANGE UP
PLATELET # BLD AUTO: 190 K/UL — SIGNIFICANT CHANGE UP (ref 150–400)
POTASSIUM SERPL-MCNC: 4.4 MMOL/L — SIGNIFICANT CHANGE UP (ref 3.5–5.3)
POTASSIUM SERPL-SCNC: 4.4 MMOL/L — SIGNIFICANT CHANGE UP (ref 3.5–5.3)
RBC # BLD: 5 M/UL — SIGNIFICANT CHANGE UP (ref 4.2–5.8)
RBC # FLD: 13.2 % — SIGNIFICANT CHANGE UP (ref 10.3–14.5)
SODIUM SERPL-SCNC: 140 MMOL/L — SIGNIFICANT CHANGE UP (ref 135–145)
WBC # BLD: 7.95 K/UL — SIGNIFICANT CHANGE UP (ref 3.8–10.5)
WBC # FLD AUTO: 7.95 K/UL — SIGNIFICANT CHANGE UP (ref 3.8–10.5)

## 2021-06-07 PROCEDURE — 93010 ELECTROCARDIOGRAM REPORT: CPT

## 2021-06-07 RX ORDER — MYCOPHENOLATE MOFETIL 250 MG/1
2 CAPSULE ORAL
Qty: 0 | Refills: 0 | DISCHARGE

## 2021-06-07 RX ORDER — SODIUM CHLORIDE 9 MG/ML
1000 INJECTION, SOLUTION INTRAVENOUS
Refills: 0 | Status: DISCONTINUED | OUTPATIENT
Start: 2021-06-15 | End: 2021-06-29

## 2021-06-07 RX ORDER — SODIUM CHLORIDE 9 MG/ML
3 INJECTION INTRAMUSCULAR; INTRAVENOUS; SUBCUTANEOUS EVERY 8 HOURS
Refills: 0 | Status: DISCONTINUED | OUTPATIENT
Start: 2021-06-15 | End: 2021-06-29

## 2021-06-07 NOTE — H&P PST ADULT - NSANTHOSAYNRD_GEN_A_CORE
No. NED screening performed.  STOP BANG Legend: 0-2 = LOW Risk; 3-4 = INTERMEDIATE Risk; 5-8 = HIGH Risk

## 2021-06-07 NOTE — H&P PST ADULT - NEGATIVE ENMT SYMPTOMS
no ear pain/no tinnitus/no vertigo/no sinus symptoms/no nasal congestion/no dry mouth/no throat pain/no dysphagia

## 2021-06-07 NOTE — H&P PST ADULT - ASSESSMENT
Problem: encounter for screening for malignant neoplasm of colon  Assessment and Plan: Patient scheduled for surgery on 6/15/21  Patient provided with verbal and written presurgical instructions; verbalized understanding  with teach back.    Patient provided with famotidine for GI prophylaxis; verbalized understanding.    Patient provided with Chlorhexidine wash, verbal and written instructions reviewed. Patient demonstrated understanding with teach back.   Patient confirmed COVID appointment     STOP BANG score met, OR booking notified  OR booking notified of implants     Medical evaluation requested by PST for , patient verbalized understanding, will obtain  Neuro evaluation requested by PST for , patient verbalized understanding, will obtain  Heme evaluation requested by PST for , patient verbalized understanding, will obtain  Case discussed with       Patient instructed to follow Eliquis plan "stop 48 hours prior to DOS" Problem: encounter for screening for malignant neoplasm of colon  Assessment and Plan: Patient scheduled for surgery on 6/15/21  Patient provided with verbal and written presurgical instructions; verbalized understanding  with teach back.    Patient provided with famotidine for GI prophylaxis; verbalized understanding.    Patient provided with Chlorhexidine wash, verbal and written instructions reviewed. Patient demonstrated understanding with teach back.   Patient confirmed COVID appointment     STOP BANG score met, OR booking notified  OR booking notified of implants     Medical evaluation requested by PST for STOP BANG score, advanced age, poor historian, abnormal EKG, low METs , patient verbalized understanding, will obtain  Neuro evaluation requested by surgeon fo MG, will obtain  Heme evaluation requested by surgeon and PST for Eliquis plan, patient verbalized understanding, will obtain     Patient instructed to follow Eliquis plan "stop 48 hours prior to DOS"

## 2021-06-07 NOTE — H&P PST ADULT - NSSUBSTANCEUSE_GEN_ALL_CORE_SD
REQUESTED OF: DR Whitehead    Chart reviewed, Hospital Day 8    NAPOLEON CAST 51yMale  HPI:  51 year old male PMH of cabg 2011, CIDP follows Dr. Mitchell has been getting IVIG q3 weeks at home, depression, anxiety, Diabetes, BPH, presents to ED for facial droop accompanied by wife.  Pt. is A0x1 baseline A0x3 has HHA 12 hours daily, coherent and can feed himself, and with home PT can sit up in bed has been unable to walk since 2013 from CIDP. Per wife at bedside Pt. has been acting different past five days, with low speech, barely talking and recently a right facial droop x 1 day. No focal muscle or sensory changes from baseline. Pt. able to follow commands and responds softly to simple questions but unaware of where he is or his name.  Wife reports gabapentin started a few months ago which causes  to be more sleepy, no other new meds started or stopped.   Per wife no fever/ chills/ headache, has chronic constipation but no acute changes. Denies chest pain, sob.  Pt. had CT head in ED which showed acute/subacute infarct in the left basal ganglia/ periventricular white matter with NIHSS 10 on admission. Pt to be admitted to stroke unit fir futher work up. (31 Aug 2020 17:40)      PAST MEDICAL & SURGICAL HISTORY:  GERD (gastroesophageal reflux disease)  BPH (benign prostatic hyperplasia)  Myocardial infarct, old  ASHD (arteriosclerotic heart disease)  Depression  Anxiety  Diabetes  CIDP (chronic inflammatory demyelinating polyneuropathy)  History of cholecystectomy  History of total left hip replacement  History of total right hip replacement: bilateral  S/P CABG x 5      Subjective and Objective:  Today,  Discussed with     Focused Palliative Care Evaluation:                   Symptoms:                                      Pain                                     Dyspnea                                     N/V                                     Appetite                                     Anxiety                                     Other _____________________                     Support Devices:              PHYSICAL EXAM:      Constitutional:    Eyes:    ENMT:    Neck:    Breasts:    Back:    Respiratory:    Cardiovascular:    Gastrointestinal:    Genitourinary:    Rectal:    Extremities:    Vascular:    Neurological:    Skin:    Lymph Nodes:    Musculoskeletal:    Psychiatric:        T(C): 37.7, Max: 37.7 (08:00)  HR: 106 (72 - 106)  BP: 166/77 (151/69 - 207/95)  RR: 23 (11 - 25)  SpO2: 96% (95% - 98%)      LABS/STUDIES:  09-08    141  |  97<L>  |  8<L>  ----------------------------<  87  3.1<L>   |  26  |  0.7 GFR >100    Ca    8.2<L>      08 Sep 2020 04:17  Mg     1.5     09-08    TPro  6.4  /  Alb  3.7  /  TBili  1.1  /  DBili  x   /  AST  23  /  ALT  14  /  AlkPhos  101  09-08                            14.4   13.22 )-----------( 238      ( 08 Sep 2020 04:17 )             45.4       EXAM:  CT BRAIN            PROCEDURE DATE:  09/06/2020            INTERPRETATION:  EXAMINATION:  CT HEAD  EXAM DATE AND TIME: 9/6/2020 5:32 PM  INDICATION: seizure  COMPARISON: CT head dated 9/2/2020.      TECHNIQUE: Multiple axial CT images of the head were obtained without contrast. Sagittal and coronal reformatted images were obtained.    FINDINGS: There is artifact from EEG leads.    Ventricles and sulci are normal in size and configuration for the patient's age, noting asymmetry of the lateral ventricles, presumably anatomic variation. No extra axial collection.  No acute intracranial hemorrhage.  No mass effect or edema.  There is question of hypoattenuation in the right cerebellar hemisphere. Unclear if this is artifact.    Redemonstration of left frontal periventricular corona radiata hypoattenuation extending to the left basal ganglia, compatible with evolving infarct, with mass effect and similar appearing rightward midline/subfalcine shift. Redemonstration of focal hypoattenuation in the right periventricular frontal white matter, presumably lacunar infarct.      Mucosal thickening in the left sphenoid sinus. Incompletely evaluated mucosal thickening in the right maxillary sinus. Calvarium is unremarkable. Partially imaged right nasoenteric tube.      IMPRESSION:    1. There is artifact from EEG electrodes.    2. Questionable hypoattenuation in the right cerebellar hemisphere. Unclear if this is a true finding or artifact. If there is clinical concern noncontrast head CT follow-up could be obtained following removal of EEG leads.    3. Redemonstration of evolving left basal ganglia/corona radiata infarct with persistent rightward midline shift of approximately 5 to 6 mm. No hemorrhagic transformation.    4. Otherwise no significant change since the prior study.    FRANCISCO JAVIER BURNS-JOSE G M.D., ATTENDING RADIOLOGIST  This document has been electronically signed. Sep  7 2020 12:54PM      MEDICATIONS  (STANDING):  aspirin  chewable 81 milliGRAM(s) Oral daily  atorvastatin 80 milliGRAM(s) Oral at bedtime  baclofen 10 milliGRAM(s) Oral two times a day  carvedilol 6.25 milliGRAM(s) Oral every 12 hours  chlorhexidine 4% Liquid 1 Application(s) Topical <User Schedule>  dextrose 50% Injectable 12.5 Gram(s) IV Push once  dextrose 50% Injectable 25 Gram(s) IV Push once  dextrose 50% Injectable 25 Gram(s) IV Push once  DULoxetine 60 milliGRAM(s) Oral two times a day  insulin glargine Injectable (LANTUS) 30 Unit(s) SubCutaneous at bedtime  insulin lispro (HumaLOG) corrective regimen sliding scale   SubCutaneous three times a day before meals  insulin lispro Injectable (HumaLOG) 9 Unit(s) SubCutaneous three times a day before meals  levETIRAcetam  IVPB 1000 milliGRAM(s) IV Intermittent every 12 hours  losartan 50 milliGRAM(s) Oral daily  magnesium sulfate  IVPB 2 Gram(s) IV Intermittent every 2 hours  pantoprazole   Suspension 40 milliGRAM(s) Oral daily  pramipexole 0.5 milliGRAM(s) Oral every 12 hours  senna 2 Tablet(s) Oral at bedtime  sodium chloride 0.9%. 1000 milliLiter(s) (100 mL/Hr) IV Continuous <Continuous>  tamsulosin 0.4 milliGRAM(s) Oral at bedtime    MEDICATIONS  (PRN):  acetaminophen   Tablet .. 650 milliGRAM(s) Oral every 6 hours PRN Moderate Pain (4 - 6)  bisacodyl 5 milliGRAM(s) Oral at bedtime PRN Constipation  dextrose 40% Gel 15 Gram(s) Oral once PRN Blood Glucose LESS THAN 70 milliGRAM(s)/deciliter  glucagon  Injectable 1 milliGRAM(s) IntraMuscular once PRN Glucose LESS THAN 70 milligrams/deciliter  morphine  - Injectable 2 milliGRAM(s) IV Push every 4 hours PRN Severe Pain (7 - 10)    iStop:         PPS  Level    __________       Note PPS = Palliative Performance Scale; (c)2001, [employer]       Range from 100% meaning Full ambulation/self-care/intake/Level of Consciousness                                                                              to        10% meaning Bedbound/Unable to do any activity/extensive disease /Total Care/ No PO intake/ LOC=Full/drowsy/+/-confusion        (0% = death)                     Prior to acute illness, patient's functionality reportedly REQUESTED OF: DR Whitehead    Chart reviewed, Hospital Day 8    OVI NICOLE 51yMale  HPI:  51 year old male PMH of cabg 2011, CIDP follows Dr. Mitchell has been getting IVIG q3 weeks at home, depression, anxiety, Diabetes, BPH, presents to ED for facial droop accompanied by wife.  Pt. is A0x1 baseline A0x3 has HHA 12 hours daily, coherent and can feed himself, and with home PT can sit up in bed has been unable to walk since 2013 from CIDP. Per wife at bedside Pt. has been acting different past five days, with low speech, barely talking and recently a right facial droop x 1 day. No focal muscle or sensory changes from baseline. Pt. able to follow commands and responds softly to simple questions but unaware of where he is or his name.  Wife reports gabapentin started a few months ago which causes  to be more sleepy, no other new meds started or stopped.   Per wife no fever/ chills/ headache, has chronic constipation but no acute changes. Denies chest pain, sob.  Pt. had CT head in ED which showed acute/subacute infarct in the left basal ganglia/ periventricular white matter with NIHSS 10 on admission. Pt to be admitted to stroke unit fir futher work up. (31 Aug 2020 17:40)      PAST MEDICAL & SURGICAL HISTORY:  GERD (gastroesophageal reflux disease)  BPH (benign prostatic hyperplasia)  Myocardial infarct, old  ASHD (arteriosclerotic heart disease)  Depression  Anxiety  Diabetes  CIDP (chronic inflammatory demyelinating polyneuropathy)  History of cholecystectomy  History of total left hip replacement  History of total right hip replacement: bilateral  S/P CABG x 5      Subjective and Objective:  Today,  Discussed with     Focused Palliative Care Evaluation:                   Symptoms:                                      Pain                                     Dyspnea                                     N/V                                     Appetite                                     Anxiety                                     Other _____________________                     Support Devices:              PHYSICAL EXAM:      Constitutional:    Eyes:    ENMT:    Neck:    Breasts:    Back:    Respiratory:    Cardiovascular:    Gastrointestinal:    Genitourinary:    Rectal:    Extremities:    Vascular:    Neurological:    Skin:    Lymph Nodes:    Musculoskeletal:    Psychiatric:        T(C): 37.7, Max: 37.7 (08:00)  HR: 106 (72 - 106)  BP: 166/77 (151/69 - 207/95)  RR: 23 (11 - 25)  SpO2: 96% (95% - 98%)      LABS/STUDIES:  09-08    141  |  97<L>  |  8<L>  ----------------------------<  87  3.1<L>   |  26  |  0.7 GFR >100    Ca    8.2<L>      08 Sep 2020 04:17  Mg     1.5     09-08    TPro  6.4  /  Alb  3.7  /  TBili  1.1  /  DBili  x   /  AST  23  /  ALT  14  /  AlkPhos  101  09-08                          14.4   13.22 )-----------( 238      ( 08 Sep 2020 04:17 )             45.4       MEDICATIONS  (STANDING):  aspirin  chewable 81 milliGRAM(s) Oral daily  atorvastatin 80 milliGRAM(s) Oral at bedtime  baclofen 10 milliGRAM(s) Oral two times a day  carvedilol 6.25 milliGRAM(s) Oral every 12 hours  chlorhexidine 4% Liquid 1 Application(s) Topical <User Schedule>  dextrose 50% Injectable 12.5 Gram(s) IV Push once  dextrose 50% Injectable 25 Gram(s) IV Push once  dextrose 50% Injectable 25 Gram(s) IV Push once  DULoxetine 60 milliGRAM(s) Oral two times a day  insulin glargine Injectable (LANTUS) 30 Unit(s) SubCutaneous at bedtime  insulin lispro (HumaLOG) corrective regimen sliding scale   SubCutaneous three times a day before meals  insulin lispro Injectable (HumaLOG) 9 Unit(s) SubCutaneous three times a day before meals  levETIRAcetam  IVPB 1000 milliGRAM(s) IV Intermittent every 12 hours  losartan 50 milliGRAM(s) Oral daily  magnesium sulfate  IVPB 2 Gram(s) IV Intermittent every 2 hours  pantoprazole   Suspension 40 milliGRAM(s) Oral daily  pramipexole 0.5 milliGRAM(s) Oral every 12 hours  senna 2 Tablet(s) Oral at bedtime  sodium chloride 0.9%. 1000 milliLiter(s) (100 mL/Hr) IV Continuous <Continuous>  tamsulosin 0.4 milliGRAM(s) Oral at bedtime    MEDICATIONS  (PRN):  acetaminophen   Tablet .. 650 milliGRAM(s) Oral every 6 hours PRN Moderate Pain (4 - 6)  bisacodyl 5 milliGRAM(s) Oral at bedtime PRN Constipation  dextrose 40% Gel 15 Gram(s) Oral once PRN Blood Glucose LESS THAN 70 milliGRAM(s)/deciliter  glucagon  Injectable 1 milliGRAM(s) IntraMuscular once PRN Glucose LESS THAN 70 milligrams/deciliter  morphine  - Injectable 2 milliGRAM(s) IV Push every 4 hours PRN Severe Pain (7 - 10)    iStop: #: [id number]   Patient Name: Ovi Nicole   YOB: 1969   Address: [address] [place] [address]   Sex: Male   Rx Written	Rx Dispensed	Drug	Quantity	Days Supply	Prescriber Name	Payment Method	Dispenser	  12/05/2019	12/06/2019	morphine sulf er 15 mg tablet 	60	20	Sibalic-Mandic, Tamara	Medicare	[id number]	  12/05/2019	12/06/2019	alprazolam 1 mg tablet 	14	7	Sibalic-Mandic, Tamara	Medicare	[id number]	  12/05/2019	12/06/2019	morphine sulfate ir 15 mg tab 	60	30	Sibalic-Mandic, Tamara	Medicare	[id number]	  12/21/2019	12/23/2019	morphine sulf er 15 mg tablet 	60	30	Lillie Camarena B, DO	Medicare	[id number]	  01/04/2020	01/05/2020	morphine sulfate ir 15 mg tab 	90	30	Lillie Camarena B, DO	Medicare	[id number]	        PPS  Level    __________       Note PPS = Palliative Performance Scale; (c)[address] [place] [address] [employer]       Range from 100% meaning Full ambulation/self-care/intake/Level of Consciousness                                                                              to        10% meaning Bedbound/Unable to do any activity/extensive disease /Total Care/ No PO intake/ LOC=Full/drowsy/+/-confusion        (0% = death)                     Prior to acute illness, patient's functionality reportedly REQUESTED OF: DR Whitehead    Chart reviewed, Hospital Day 8    OVI NICOLE 51yMale  HPI:  51 year old male PMH of cabg 2011, CIDP follows Dr. Mitchell has been getting IVIG q3 weeks at home, depression, anxiety, Diabetes, BPH, presents to ED for facial droop accompanied by wife.  Pt. is A0x1 baseline A0x3 has HHA 12 hours daily, coherent and can feed himself, and with home PT can sit up in bed has been unable to walk since 2013 from CIDP. Per wife at bedside Pt. has been acting different past five days, with low speech, barely talking and recently a right facial droop x 1 day. No focal muscle or sensory changes from baseline. Pt. able to follow commands and responds softly to simple questions but unaware of where he is or his name.  Wife reports gabapentin started a few months ago which causes  to be more sleepy, no other new meds started or stopped.   Per wife no fever/ chills/ headache, has chronic constipation but no acute changes. Denies chest pain, sob.  Pt. had CT head in ED which showed acute/subacute infarct in the left basal ganglia/ periventricular white matter with NIHSS 10 on admission. Pt to be admitted to stroke unit fir futher work up. (31 Aug 2020 17:40)      PAST MEDICAL & SURGICAL HISTORY:  GERD (gastroesophageal reflux disease)  BPH (benign prostatic hyperplasia)  Myocardial infarct, old  ASHD (arteriosclerotic heart disease)  Depression  Anxiety  Diabetes  CIDP (chronic inflammatory demyelinating polyneuropathy)  History of cholecystectomy  History of total left hip replacement  History of total right hip replacement: bilateral  S/P CABG x 5    Subjective and Objective:  Today,  Discussed with nursing and house staff    Focused Palliative Care Evaluation:                   Symptoms: no evidence of the following                                     Pain                                     Dyspnea                                     N/V                                     Appetite                                     Anxiety                                     Other _____________________                     Support Devices: NGT enteral feeddings             PHYSICAL EXAM:      Constitutional: obese; appears older than age    Eyes: momentary l tracking and not consistent    Respiratory: Easy and unlabored, no accessory muscle use; diminished at bases    Cardiovascular: HRR, S1,S2 SR    Gastrointestinal: Obese; soft, tympanic    Genitourinary: graham - clear yellow    Extremities: bilateral foot drop; + edema    Neurological: Not engaging in exam; no tremors     Skin: moist    T(C): 37.7, Max: 37.7 (08:00)  HR: 106 (72 - 106)  BP: 166/77 (151/69 - 207/95)  RR: 23 (11 - 25)  SpO2: 96% (95% - 98%)      LABS/STUDIES:  09-08    141  |  97<L>  |  8<L>  ----------------------------<  87  3.1<L>   |  26  |  0.7 GFR >100    Ca    8.2<L>      08 Sep 2020 04:17  Mg     1.5     09-08    TPro  6.4  /  Alb  3.7  /  TBili  1.1  /  DBili  x   /  AST  23  /  ALT  14  /  AlkPhos  101  09-08                          14.4   13.22 )-----------( 238      ( 08 Sep 2020 04:17 )             45.4       MEDICATIONS  (STANDING):  aspirin  chewable 81 milliGRAM(s) Oral daily  atorvastatin 80 milliGRAM(s) Oral at bedtime  baclofen 10 milliGRAM(s) Oral two times a day  carvedilol 6.25 milliGRAM(s) Oral every 12 hours  chlorhexidine 4% Liquid 1 Application(s) Topical <User Schedule>  dextrose 50% Injectable 12.5 Gram(s) IV Push once  dextrose 50% Injectable 25 Gram(s) IV Push once  dextrose 50% Injectable 25 Gram(s) IV Push once  DULoxetine 60 milliGRAM(s) Oral two times a day  insulin glargine Injectable (LANTUS) 30 Unit(s) SubCutaneous at bedtime  insulin lispro (HumaLOG) corrective regimen sliding scale   SubCutaneous three times a day before meals  insulin lispro Injectable (HumaLOG) 9 Unit(s) SubCutaneous three times a day before meals  levETIRAcetam  IVPB 1000 milliGRAM(s) IV Intermittent every 12 hours  losartan 50 milliGRAM(s) Oral daily  magnesium sulfate  IVPB 2 Gram(s) IV Intermittent every 2 hours  pantoprazole   Suspension 40 milliGRAM(s) Oral daily  pramipexole 0.5 milliGRAM(s) Oral every 12 hours  senna 2 Tablet(s) Oral at bedtime  sodium chloride 0.9%. 1000 milliLiter(s) (100 mL/Hr) IV Continuous <Continuous>  tamsulosin 0.4 milliGRAM(s) Oral at bedtime    MEDICATIONS  (PRN):  acetaminophen   Tablet .. 650 milliGRAM(s) Oral every 6 hours PRN Moderate Pain (4 - 6)  bisacodyl 5 milliGRAM(s) Oral at bedtime PRN Constipation  dextrose 40% Gel 15 Gram(s) Oral once PRN Blood Glucose LESS THAN 70 milliGRAM(s)/deciliter  glucagon  Injectable 1 milliGRAM(s) IntraMuscular once PRN Glucose LESS THAN 70 milligrams/deciliter  morphine  - Injectable 2 milliGRAM(s) IV Push every 4 hours PRN Severe Pain (7 - 10)    iStop: #: [id number]   Patient Name: Ovi Nicole   YOB: 1969   Address: [address] [place] [address]   Sex: Male   Rx Written	Rx Dispensed	Drug	Quantity	Days Supply	Prescriber Name	Payment Method	Dispenser	  12/05/2019	12/06/2019	morphine sulf er 15 mg tablet 	60	20	Sibalic-Mandic, Tamara	Medicare	[id number]	  12/05/2019	12/06/2019	alprazolam 1 mg tablet 	14	7	Sibalic-Mandic, Tamara	Medicare	[id number]	  12/05/2019	12/06/2019	morphine sulfate ir 15 mg tab 	60	30	Sibalic-Mandic, Tamara	Medicare	[id number]	  12/21/2019	12/23/2019	morphine sulf er 15 mg tablet 	60	30	Lillie Camarena B, DO	Medicare	[id number]	  01/04/2020	01/05/2020	morphine sulfate ir 15 mg tab 	90	30	Lillie Camarena B, DO	Medicare	[id number]	        PPS  Level   10%       Note PPS = Palliative Performance Scale; (c)2001, [place] [employer]       Range from 100% meaning Full ambulation/self-care/intake/Level of Consciousness                                                                              to        10% meaning Bedbound/Unable to do any activity/extensive disease /Total Care/ No PO intake/ LOC=Full/drowsy/+/-confusion        (0% = death)                     Prior to acute illness, patient's functionality reportedly has been more and more dependent; no walking since 2016; HHA 12H/day REQUESTED OF: DR Whitehead    Chart reviewed, Hospital Day 8    OVI NICOLE 51yMale  HPI:  51 year old male PMH of cabg 2011, CIDP follows Dr. Mitchell has been getting IVIG q3 weeks at home, depression, anxiety, Diabetes, BPH, presents to ED for facial droop accompanied by wife.  Pt. is A0x1 baseline A0x3 has HHA 12 hours daily, coherent and can feed himself, and with home PT can sit up in bed has been unable to walk since 2013 from CIDP. Per wife at bedside Pt. has been acting different past five days, with low speech, barely talking and recently a right facial droop x 1 day. No focal muscle or sensory changes from baseline. Pt. able to follow commands and responds softly to simple questions but unaware of where he is or his name.  Wife reports gabapentin started a few months ago which causes  to be more sleepy, no other new meds started or stopped.   Per wife no fever/ chills/ headache, has chronic constipation but no acute changes. Denies chest pain, sob.  Pt. had CT head in ED which showed acute/subacute infarct in the left basal ganglia/ periventricular white matter with NIHSS 10 on admission. Pt to be admitted to stroke unit fir futher work up. (31 Aug 2020 17:40)    Patient seen and examined at bedside. He was awake but not able to respond. Patient did not appear dyspneic or in distress. We will reach out to wife.     PAST MEDICAL & SURGICAL HISTORY:  GERD (gastroesophageal reflux disease)  BPH (benign prostatic hyperplasia)  Myocardial infarct, old  ASHD (arteriosclerotic heart disease)  Depression  Anxiety  Diabetes  CIDP (chronic inflammatory demyelinating polyneuropathy)  History of cholecystectomy  History of total left hip replacement  History of total right hip replacement: bilateral  S/P CABG x 5    Subjective and Objective:  Today,  Discussed with nursing and house staff    Focused Palliative Care Evaluation:                   Symptoms: no evidence of the following                                     Pain                                     Dyspnea                                     N/V                                     Appetite                                     Anxiety                                     Other _____________________                     Support Devices: NGT enteral feeddings             PHYSICAL EXAM:    Constitutional: obese; appears older than age    HEENT: normocephalic, atraumatic     Eyes: momentary  tracking and not consistent    Respiratory: Easy and unlabored, no accessory muscle use; diminished at bases    Cardiovascular: HRR, S1,S2 SR    Gastrointestinal: Obese; soft, tympanic, not didtended     Genitourinary: graham - clear yellow    Extremities: bilateral foot drop; + edema    Neurological: Not engaging in exam; no tremors     Skin: moist    T(C): 37.7, Max: 37.7 (08:00)  HR: 106 (72 - 106)  BP: 166/77 (151/69 - 207/95)  RR: 23 (11 - 25)  SpO2: 96% (95% - 98%)      LABS/STUDIES:  09-08    141  |  97<L>  |  8<L>  ----------------------------<  87  3.1<L>   |  26  |  0.7 GFR >100    Ca    8.2<L>      08 Sep 2020 04:17  Mg     1.5     09-08    TPro  6.4  /  Alb  3.7  /  TBili  1.1  /  DBili  x   /  AST  23  /  ALT  14  /  AlkPhos  101  09-08                          14.4   13.22 )-----------( 238      ( 08 Sep 2020 04:17 )             45.4       MEDICATIONS  (STANDING):  aspirin  chewable 81 milliGRAM(s) Oral daily  atorvastatin 80 milliGRAM(s) Oral at bedtime  baclofen 10 milliGRAM(s) Oral two times a day  carvedilol 6.25 milliGRAM(s) Oral every 12 hours  chlorhexidine 4% Liquid 1 Application(s) Topical <User Schedule>  dextrose 50% Injectable 12.5 Gram(s) IV Push once  dextrose 50% Injectable 25 Gram(s) IV Push once  dextrose 50% Injectable 25 Gram(s) IV Push once  DULoxetine 60 milliGRAM(s) Oral two times a day  insulin glargine Injectable (LANTUS) 30 Unit(s) SubCutaneous at bedtime  insulin lispro (HumaLOG) corrective regimen sliding scale   SubCutaneous three times a day before meals  insulin lispro Injectable (HumaLOG) 9 Unit(s) SubCutaneous three times a day before meals  levETIRAcetam  IVPB 1000 milliGRAM(s) IV Intermittent every 12 hours  losartan 50 milliGRAM(s) Oral daily  magnesium sulfate  IVPB 2 Gram(s) IV Intermittent every 2 hours  pantoprazole   Suspension 40 milliGRAM(s) Oral daily  pramipexole 0.5 milliGRAM(s) Oral every 12 hours  senna 2 Tablet(s) Oral at bedtime  sodium chloride 0.9%. 1000 milliLiter(s) (100 mL/Hr) IV Continuous <Continuous>  tamsulosin 0.4 milliGRAM(s) Oral at bedtime    MEDICATIONS  (PRN):  acetaminophen   Tablet .. 650 milliGRAM(s) Oral every 6 hours PRN Moderate Pain (4 - 6)  bisacodyl 5 milliGRAM(s) Oral at bedtime PRN Constipation  dextrose 40% Gel 15 Gram(s) Oral once PRN Blood Glucose LESS THAN 70 milliGRAM(s)/deciliter  glucagon  Injectable 1 milliGRAM(s) IntraMuscular once PRN Glucose LESS THAN 70 milligrams/deciliter  morphine  - Injectable 2 milliGRAM(s) IV Push every 4 hours PRN Severe Pain (7 - 10)    iStop: #: [id number]   Patient Name: Ovi Nicole   YOB: 1969   Address: [address] [place] [address]   Sex: Male   Rx Written	Rx Dispensed	Drug	Quantity	Days Supply	Prescriber Name	Payment Method	Dispenser	  12/05/2019	12/06/2019	morphine sulf er 15 mg tablet 	60	20	Sibalic-Mandic, Tamara	Medicare	[id number]	  12/05/2019	12/06/2019	alprazolam 1 mg tablet 	14	7	Sibalic-Mandic, Tamara	Medicare	[id number]	  12/05/2019	12/06/2019	morphine sulfate ir 15 mg tab 	60	30	Sibalic-Mandic, Tamara	Medicare	[id number]	  12/21/2019	12/23/2019	morphine sulf er 15 mg tablet 	60	30	Lillie Camarena B, DO	Medicare	[id number]	  01/04/2020	01/05/2020	morphine sulfate ir 15 mg tab 	90	30	Lillie Camarena B, DO	Medicare	[id number]	        PPS  Level   10%       Note PPS = Palliative Performance Scale; (c)2001, [employer] [facility] [employer]       Range from 100% meaning Full ambulation/self-care/intake/Level of Consciousness                                                                              to        10% meaning Bedbound/Unable to do any activity/extensive disease /Total Care/ No PO intake/ LOC=Full/drowsy/+/-confusion        (0% = death)                     Prior to acute illness, patient's functionality reportedly has been more and more dependent; no walking since 2016; HHA 12H/day denies illicit drug use/never used/caffeine

## 2021-06-07 NOTE — H&P PST ADULT - NS MD HP SKIN WOUND STATUS
mid abdominal wound- superficial dressed with bandaid, wound bed red, no discharge or smell noted/clean

## 2021-06-07 NOTE — H&P PST ADULT - HISTORY OF PRESENT ILLNESS
70 year old male, poor historian, [place], PMHx- myasthenia gravis, DVT, PE  presents to PST with preop dx of encounter for screening for malignant neoplasm of colon scheduled for colonoscopy

## 2021-06-07 NOTE — H&P PST ADULT - NSICDXPASTSURGICALHX_GEN_ALL_CORE_FT
PAST SURGICAL HISTORY:  Acute back pain back surgery times 2?? hardware (screw) in place    Appendicitis appendectomy    Cholecystitis cholecystectomy    Colon abnormality colon resection for diverticulitis    H/O foot surgery left hardware in place    Kidney stone removed    Tonsillar abscess tonsillectomy

## 2021-06-07 NOTE — H&P PST ADULT - NSICDXPASTMEDICALHX_GEN_ALL_CORE_FT
PAST MEDICAL HISTORY:  Diverticulitis     DVT, lower extremity last occurence 3 years ago    Encounter for screening for malignant neoplasm of colon     Kidney stones     Myasthenia gravis     Obese     Other acute pulmonary embolism without acute cor pulmonale over 10 years ago    Pulmonary embolism

## 2021-06-07 NOTE — H&P PST ADULT - NEUROLOGICAL COMMENTS
patient is poor historian , PHx myastenia gravis, lower back pain with "nerve damage extending to legs"

## 2021-06-13 ENCOUNTER — APPOINTMENT (OUTPATIENT)
Dept: DISASTER EMERGENCY | Facility: CLINIC | Age: 71
End: 2021-06-13

## 2021-06-13 DIAGNOSIS — Z01.818 ENCOUNTER FOR OTHER PREPROCEDURAL EXAMINATION: ICD-10-CM

## 2021-06-13 LAB — SARS-COV-2 N GENE NPH QL NAA+PROBE: NOT DETECTED

## 2021-06-15 ENCOUNTER — APPOINTMENT (OUTPATIENT)
Dept: INTERNAL MEDICINE | Facility: HOSPITAL | Age: 71
End: 2021-06-15
Payer: MEDICARE

## 2021-06-15 ENCOUNTER — RESULT REVIEW (OUTPATIENT)
Age: 71
End: 2021-06-15

## 2021-06-15 ENCOUNTER — OUTPATIENT (OUTPATIENT)
Dept: OUTPATIENT SERVICES | Facility: HOSPITAL | Age: 71
LOS: 1 days | Discharge: HOME | End: 2021-06-15
Payer: MEDICARE

## 2021-06-15 VITALS
HEART RATE: 74 BPM | TEMPERATURE: 99 F | SYSTOLIC BLOOD PRESSURE: 149 MMHG | DIASTOLIC BLOOD PRESSURE: 83 MMHG | OXYGEN SATURATION: 97 % | RESPIRATION RATE: 12 BRPM

## 2021-06-15 VITALS — OXYGEN SATURATION: 97 % | HEART RATE: 62 BPM | DIASTOLIC BLOOD PRESSURE: 78 MMHG | SYSTOLIC BLOOD PRESSURE: 164 MMHG

## 2021-06-15 DIAGNOSIS — J36 PERITONSILLAR ABSCESS: Chronic | ICD-10-CM

## 2021-06-15 DIAGNOSIS — Z98.890 OTHER SPECIFIED POSTPROCEDURAL STATES: Chronic | ICD-10-CM

## 2021-06-15 DIAGNOSIS — K63.9 DISEASE OF INTESTINE, UNSPECIFIED: Chronic | ICD-10-CM

## 2021-06-15 DIAGNOSIS — K37 UNSPECIFIED APPENDICITIS: Chronic | ICD-10-CM

## 2021-06-15 DIAGNOSIS — Z12.11 ENCOUNTER FOR SCREENING FOR MALIGNANT NEOPLASM OF COLON: ICD-10-CM

## 2021-06-15 DIAGNOSIS — M54.9 DORSALGIA, UNSPECIFIED: Chronic | ICD-10-CM

## 2021-06-15 DIAGNOSIS — K81.9 CHOLECYSTITIS, UNSPECIFIED: Chronic | ICD-10-CM

## 2021-06-15 DIAGNOSIS — N20.0 CALCULUS OF KIDNEY: Chronic | ICD-10-CM

## 2021-06-15 PROCEDURE — 45385 COLONOSCOPY W/LESION REMOVAL: CPT | Mod: PT

## 2021-06-15 PROCEDURE — 88305 TISSUE EXAM BY PATHOLOGIST: CPT | Mod: 26

## 2021-06-15 RX ORDER — SODIUM CHLORIDE 9 MG/ML
1000 INJECTION, SOLUTION INTRAVENOUS
Refills: 0 | Status: DISCONTINUED | OUTPATIENT
Start: 2021-06-15 | End: 2021-06-29

## 2021-06-15 RX ADMIN — SODIUM CHLORIDE 30 MILLILITER(S): 9 INJECTION, SOLUTION INTRAVENOUS at 15:19

## 2021-06-15 NOTE — ASU PATIENT PROFILE, ADULT - PSH
Acute back pain  back surgery times 2?? hardware (screw) in place  Appendicitis  appendectomy  Cholecystitis  cholecystectomy  Colon abnormality  colon resection for diverticulitis  H/O foot surgery  left hardware in place  Kidney stone  removed  Tonsillar abscess  tonsillectomy

## 2021-06-15 NOTE — ASU PATIENT PROFILE, ADULT - PMH
Diverticulitis    DVT, lower extremity  last occurence 3 years ago  Encounter for screening for malignant neoplasm of colon    Kidney stones    Myasthenia gravis    Obese    Other acute pulmonary embolism without acute cor pulmonale  over 10 years ago  Pulmonary embolism

## 2021-06-17 LAB — SURGICAL PATHOLOGY STUDY: SIGNIFICANT CHANGE UP

## 2021-07-06 PROBLEM — I26.99 OTHER PULMONARY EMBOLISM WITHOUT ACUTE COR PULMONALE: Chronic | Status: ACTIVE | Noted: 2021-06-07

## 2021-07-06 PROBLEM — K57.92 DIVERTICULITIS OF INTESTINE, PART UNSPECIFIED, WITHOUT PERFORATION OR ABSCESS WITHOUT BLEEDING: Chronic | Status: ACTIVE | Noted: 2021-06-07

## 2021-07-06 PROBLEM — I26.99 OTHER PULMONARY EMBOLISM WITHOUT ACUTE COR PULMONALE: Chronic | Status: ACTIVE | Noted: 2017-05-24

## 2021-07-06 PROBLEM — I82.409 ACUTE EMBOLISM AND THROMBOSIS OF UNSPECIFIED DEEP VEINS OF UNSPECIFIED LOWER EXTREMITY: Chronic | Status: ACTIVE | Noted: 2021-06-07

## 2021-07-06 PROBLEM — Z12.11 ENCOUNTER FOR SCREENING FOR MALIGNANT NEOPLASM OF COLON: Chronic | Status: ACTIVE | Noted: 2021-06-07

## 2021-07-06 PROBLEM — N20.0 CALCULUS OF KIDNEY: Chronic | Status: ACTIVE | Noted: 2021-06-07

## 2021-07-06 PROBLEM — E66.9 OBESITY, UNSPECIFIED: Chronic | Status: ACTIVE | Noted: 2021-06-07

## 2021-07-29 ENCOUNTER — APPOINTMENT (OUTPATIENT)
Dept: FAMILY MEDICINE | Facility: CLINIC | Age: 71
End: 2021-07-29
Payer: MEDICARE

## 2021-07-29 VITALS
BODY MASS INDEX: 37.51 KG/M2 | OXYGEN SATURATION: 98 % | HEIGHT: 67 IN | SYSTOLIC BLOOD PRESSURE: 130 MMHG | WEIGHT: 239 LBS | DIASTOLIC BLOOD PRESSURE: 72 MMHG | HEART RATE: 66 BPM

## 2021-07-29 PROCEDURE — 99072 ADDL SUPL MATRL&STAF TM PHE: CPT

## 2021-07-29 PROCEDURE — 99214 OFFICE O/P EST MOD 30 MIN: CPT | Mod: 25

## 2021-07-29 PROCEDURE — 36415 COLL VENOUS BLD VENIPUNCTURE: CPT

## 2021-07-30 LAB
ALBUMIN SERPL ELPH-MCNC: 4.1 G/DL
ALP BLD-CCNC: 66 U/L
ALT SERPL-CCNC: 17 U/L
ANION GAP SERPL CALC-SCNC: 13 MMOL/L
AST SERPL-CCNC: 13 U/L
BASOPHILS # BLD AUTO: 0.06 K/UL
BASOPHILS NFR BLD AUTO: 0.8 %
BILIRUB SERPL-MCNC: 0.6 MG/DL
BUN SERPL-MCNC: 19 MG/DL
CALCIUM SERPL-MCNC: 9.3 MG/DL
CHLORIDE SERPL-SCNC: 103 MMOL/L
CHOLEST SERPL-MCNC: 140 MG/DL
CO2 SERPL-SCNC: 24 MMOL/L
CREAT SERPL-MCNC: 0.8 MG/DL
EOSINOPHIL # BLD AUTO: 0.07 K/UL
EOSINOPHIL NFR BLD AUTO: 0.9 %
GLUCOSE SERPL-MCNC: 149 MG/DL
HCT VFR BLD CALC: 44.6 %
HDLC SERPL-MCNC: 42 MG/DL
HGB BLD-MCNC: 14.5 G/DL
IMM GRANULOCYTES NFR BLD AUTO: 0.3 %
LDLC SERPL CALC-MCNC: 78 MG/DL
LYMPHOCYTES # BLD AUTO: 1.79 K/UL
LYMPHOCYTES NFR BLD AUTO: 24.2 %
MAN DIFF?: NORMAL
MCHC RBC-ENTMCNC: 29.1 PG
MCHC RBC-ENTMCNC: 32.5 GM/DL
MCV RBC AUTO: 89.4 FL
MONOCYTES # BLD AUTO: 0.6 K/UL
MONOCYTES NFR BLD AUTO: 8.1 %
NEUTROPHILS # BLD AUTO: 4.86 K/UL
NEUTROPHILS NFR BLD AUTO: 65.7 %
NONHDLC SERPL-MCNC: 98 MG/DL
PLATELET # BLD AUTO: 243 K/UL
POTASSIUM SERPL-SCNC: 4.3 MMOL/L
PROT SERPL-MCNC: 6.4 G/DL
RBC # BLD: 4.99 M/UL
RBC # FLD: 13.8 %
SODIUM SERPL-SCNC: 139 MMOL/L
TRIGL SERPL-MCNC: 101 MG/DL
TSH SERPL-ACNC: 1.82 UIU/ML
WBC # FLD AUTO: 7.4 K/UL

## 2022-07-13 ENCOUNTER — APPOINTMENT (OUTPATIENT)
Dept: FAMILY MEDICINE | Facility: CLINIC | Age: 72
End: 2022-07-13

## 2022-07-13 VITALS
DIASTOLIC BLOOD PRESSURE: 76 MMHG | BODY MASS INDEX: 37.51 KG/M2 | SYSTOLIC BLOOD PRESSURE: 110 MMHG | HEART RATE: 83 BPM | OXYGEN SATURATION: 98 % | WEIGHT: 239 LBS | TEMPERATURE: 97.5 F | HEIGHT: 67 IN

## 2022-07-13 LAB — HBA1C MFR BLD HPLC: 6.5

## 2022-07-13 PROCEDURE — 99214 OFFICE O/P EST MOD 30 MIN: CPT | Mod: 25

## 2022-07-13 PROCEDURE — 83036 HEMOGLOBIN GLYCOSYLATED A1C: CPT | Mod: QW

## 2022-07-13 RX ORDER — SODIUM SULFATE, POTASSIUM SULFATE, MAGNESIUM SULFATE 17.5; 3.13; 1.6 G/ML; G/ML; G/ML
17.5-3.13-1.6 SOLUTION, CONCENTRATE ORAL
Qty: 1 | Refills: 0 | Status: DISCONTINUED | COMMUNITY
Start: 2021-05-10 | End: 2022-07-13

## 2022-07-13 NOTE — HISTORY OF PRESENT ILLNESS
[de-identified] : 71 year old male is here for a followup visit. Patient is here for medication renewals and for blood work discussion. Medications and allergies were reviewed and assessed.  \par was sent here by neuro for high sugars \par

## 2022-07-13 NOTE — ASSESSMENT
[FreeTextEntry1] : myasthenia gravis\par sees neuro\par \par patient brought in bw from neuro\par elevated sugars\par \par reviewed bw with patient and wife in realtime and explained levels\par \par diabetes\par The diagnosis of diabetes is established with this patient. Previous blood work was reviewed prior to the visit and with the patient at time of visit.  Blood work values were explained as well.  Blood work was drawn in office and results will be reviewed and followed. The patient was counseled on using a low sugar and carbohydrate diet.  Patient was advised to eat small meals high in protein and to exercise regularly. Patient as advised to take all medications as prescribed, compliance with medications was reinforced.  Patient should follow up with yearly podiatry and opthalmology visits. Patient was advised to call office  or go to the ER immediately if experiences any nausea, lightheadedness or for any other issues.\par a1c today 6.5, start metformin, fu in 3 months for recheck

## 2022-08-11 ENCOUNTER — APPOINTMENT (OUTPATIENT)
Dept: FAMILY MEDICINE | Facility: CLINIC | Age: 72
End: 2022-08-11

## 2022-10-04 ENCOUNTER — APPOINTMENT (OUTPATIENT)
Dept: FAMILY MEDICINE | Facility: CLINIC | Age: 72
End: 2022-10-04

## 2022-10-04 VITALS
HEIGHT: 67 IN | DIASTOLIC BLOOD PRESSURE: 83 MMHG | TEMPERATURE: 97.8 F | HEART RATE: 72 BPM | BODY MASS INDEX: 34.72 KG/M2 | OXYGEN SATURATION: 98 % | WEIGHT: 221.19 LBS | SYSTOLIC BLOOD PRESSURE: 125 MMHG

## 2022-10-04 PROCEDURE — 36415 COLL VENOUS BLD VENIPUNCTURE: CPT

## 2022-10-04 PROCEDURE — 99214 OFFICE O/P EST MOD 30 MIN: CPT | Mod: 25

## 2022-10-04 RX ORDER — ATORVASTATIN CALCIUM 10 MG/1
10 TABLET, FILM COATED ORAL
Refills: 0 | Status: DISCONTINUED | COMMUNITY
End: 2022-10-04

## 2022-10-05 LAB
ALBUMIN SERPL ELPH-MCNC: 4.5 G/DL
ALP BLD-CCNC: 75 U/L
ALT SERPL-CCNC: 12 U/L
ANION GAP SERPL CALC-SCNC: 14 MMOL/L
AST SERPL-CCNC: 16 U/L
BASOPHILS # BLD AUTO: 0.06 K/UL
BASOPHILS NFR BLD AUTO: 0.7 %
BILIRUB SERPL-MCNC: 0.7 MG/DL
BUN SERPL-MCNC: 18 MG/DL
CALCIUM SERPL-MCNC: 9.6 MG/DL
CHLORIDE SERPL-SCNC: 101 MMOL/L
CHOLEST SERPL-MCNC: 160 MG/DL
CO2 SERPL-SCNC: 25 MMOL/L
CREAT SERPL-MCNC: 0.78 MG/DL
EGFR: 95 ML/MIN/1.73M2
EOSINOPHIL # BLD AUTO: 0.11 K/UL
EOSINOPHIL NFR BLD AUTO: 1.4 %
ESTIMATED AVERAGE GLUCOSE: 134 MG/DL
GLUCOSE SERPL-MCNC: 138 MG/DL
HBA1C MFR BLD HPLC: 6.3 %
HCT VFR BLD CALC: 44.7 %
HDLC SERPL-MCNC: 50 MG/DL
HGB BLD-MCNC: 14.1 G/DL
IMM GRANULOCYTES NFR BLD AUTO: 0.2 %
LDLC SERPL CALC-MCNC: 93 MG/DL
LYMPHOCYTES # BLD AUTO: 1.81 K/UL
LYMPHOCYTES NFR BLD AUTO: 22.3 %
MAN DIFF?: NORMAL
MCHC RBC-ENTMCNC: 29.3 PG
MCHC RBC-ENTMCNC: 31.5 GM/DL
MCV RBC AUTO: 92.9 FL
MONOCYTES # BLD AUTO: 0.59 K/UL
MONOCYTES NFR BLD AUTO: 7.3 %
NEUTROPHILS # BLD AUTO: 5.53 K/UL
NEUTROPHILS NFR BLD AUTO: 68.1 %
NONHDLC SERPL-MCNC: 110 MG/DL
PLATELET # BLD AUTO: 237 K/UL
POTASSIUM SERPL-SCNC: 4.5 MMOL/L
PROT SERPL-MCNC: 7 G/DL
RBC # BLD: 4.81 M/UL
RBC # FLD: 13.5 %
SODIUM SERPL-SCNC: 139 MMOL/L
TRIGL SERPL-MCNC: 83 MG/DL
TSH SERPL-ACNC: 2.26 UIU/ML
WBC # FLD AUTO: 8.12 K/UL

## 2022-10-05 NOTE — H&P ADULT. - CONSTITUTIONAL
Pt reports including himself there are 9 kids and he is the 2nd youngest.   Well-developed, well nourished

## 2022-12-13 ENCOUNTER — EMERGENCY (EMERGENCY)
Facility: HOSPITAL | Age: 72
LOS: 0 days | Discharge: HOME | End: 2022-12-14
Attending: EMERGENCY MEDICINE

## 2022-12-13 VITALS
TEMPERATURE: 97 F | WEIGHT: 223.11 LBS | HEIGHT: 67 IN | DIASTOLIC BLOOD PRESSURE: 74 MMHG | RESPIRATION RATE: 18 BRPM | HEART RATE: 76 BPM | OXYGEN SATURATION: 96 % | SYSTOLIC BLOOD PRESSURE: 113 MMHG

## 2022-12-13 DIAGNOSIS — K37 UNSPECIFIED APPENDICITIS: Chronic | ICD-10-CM

## 2022-12-13 DIAGNOSIS — Z98.890 OTHER SPECIFIED POSTPROCEDURAL STATES: Chronic | ICD-10-CM

## 2022-12-13 DIAGNOSIS — Z86.711 PERSONAL HISTORY OF PULMONARY EMBOLISM: ICD-10-CM

## 2022-12-13 DIAGNOSIS — K81.9 CHOLECYSTITIS, UNSPECIFIED: Chronic | ICD-10-CM

## 2022-12-13 DIAGNOSIS — R05.9 COUGH, UNSPECIFIED: ICD-10-CM

## 2022-12-13 DIAGNOSIS — M54.9 DORSALGIA, UNSPECIFIED: Chronic | ICD-10-CM

## 2022-12-13 DIAGNOSIS — Z87.19 PERSONAL HISTORY OF OTHER DISEASES OF THE DIGESTIVE SYSTEM: ICD-10-CM

## 2022-12-13 DIAGNOSIS — N20.0 CALCULUS OF KIDNEY: Chronic | ICD-10-CM

## 2022-12-13 DIAGNOSIS — J36 PERITONSILLAR ABSCESS: Chronic | ICD-10-CM

## 2022-12-13 DIAGNOSIS — K63.9 DISEASE OF INTESTINE, UNSPECIFIED: Chronic | ICD-10-CM

## 2022-12-13 DIAGNOSIS — G35 MULTIPLE SCLEROSIS: ICD-10-CM

## 2022-12-13 DIAGNOSIS — Z86.718 PERSONAL HISTORY OF OTHER VENOUS THROMBOSIS AND EMBOLISM: ICD-10-CM

## 2022-12-13 PROCEDURE — 99283 EMERGENCY DEPT VISIT LOW MDM: CPT

## 2022-12-13 NOTE — ED ADULT TRIAGE NOTE - CHIEF COMPLAINT QUOTE
hx of diverticulitis , PE, DVT, MS, PW possible allergic reaction pt took amoxicillin and Tessalon pearls at 1400 and 2000. At [address] pt reported " mouth feeling numb, and throat closing". pt states feeling lasted " 3-4 minutes". symptoms resolved PTA to ED. pt c/o epigastric " burning" pain. pt speaking in complete sentences, no respiratory distress or hives noted.

## 2022-12-14 VITALS
DIASTOLIC BLOOD PRESSURE: 85 MMHG | HEART RATE: 67 BPM | RESPIRATION RATE: 16 BRPM | OXYGEN SATURATION: 97 % | SYSTOLIC BLOOD PRESSURE: 142 MMHG | TEMPERATURE: 98 F

## 2022-12-14 NOTE — ED PROVIDER NOTE - OBJECTIVE STATEMENT
70 yo M with coughing after taking tessalon and amox for bronchitis.  Pt. took two separate doses--2pm and 8 pm--both meds.  At 8 pm, pt. think pill may have broken in mouth--he had a "mediciney" taste and started to cough profusely.  Pt. thought his throat was swelling.  Sensation lasted for a few hours, then resolved by 12 AM (2 hours ago).  Pt. currently has no complaints.  He feels otherwise well, denies prior allergic reaction to penicillin.  No rash, no wheezing, no other associated symptoms.  Recent RVP swab and XR in PCP office were WNL, no pna, negative virus.  ROS: negative for fever, headache, chest pain, shortness of breath, abd pain, nausea, vomiting, diarrhea, rash, paresthesia, and weakness--all other systems reviewed are negative.   PMH: diverticulitis , PE, DVT, MS; Meds: See EMR for list; SH: Denies smoking/drinking/drug use

## 2022-12-14 NOTE — ED PROVIDER NOTE - NSFOLLOWUPCLINICS_GEN_ALL_ED_FT
[facility]  Allergy  [address] [place] [address]  Phone: [phone]  Fax:   Follow Up Time: Urgent

## 2022-12-14 NOTE — ED PROVIDER NOTE - PATIENT PORTAL LINK FT
You can access the FollowMyHealth Patient Portal offered by [facility] by registering at the following website: [email or web address]. By joining [facility]’s FollowMyHealth portal, you will also be able to view your health information using other applications (apps) compatible with our system.

## 2022-12-14 NOTE — ED PROVIDER NOTE - PHYSICAL EXAMINATION
Vitals: WNL  Gen: AAOx3, NAD, sitting comfortably in stretcher, calm, non-toxic  ENT: no posterior oropharynx or tongue swelling, no erythema, no exudate   Head: ncat, perrla, eomi b/l  Neck: supple, no lymphadenopathy, no midline deviation  Heart: rrr, no m/r/g  Lungs: + diffuse rhonchus breath sounds  Abd: soft, nontender, non-distended, no rebound or guarding  Ext: no clubbing/cyanosis/edema  Neuro: sensation and muscle strength intact b/l, steady gait

## 2022-12-14 NOTE — ED ADULT NURSE NOTE - OBJECTIVE STATEMENT
AAOx3 pt c/o  of possible allergic reaction, pt reports "burning in throat and esophagus" and feeling like throat was closing up at home after taking amoxicillin and tesslon almas that is used to treat cough at home. Pt breathing unlabored and spontaneous, air way clear, upper lobes of lungs bilaterally are clear, rhonchi heard lower lobes bilaterally. No swelling, no nausea, no rash. Pt denies allergies. PMH: DM

## 2022-12-14 NOTE — ED PROVIDER NOTE - CLINICAL SUMMARY MEDICAL DECISION MAKING FREE TEXT BOX
70 yo M with likely coughing episode from liquid pill breaking in mouth causing aspiration; no traditional symptoms or allergic reaction, exam in benign at this time  -recommended pt. avoid amox and penicillin based antbx until cleared by allergist  -will prescribe alternate antbx for patient to take until he f/u with pcp urgently   -pt. .is stable and comfortable without complaints at this time, safe for d/c home

## 2022-12-14 NOTE — ED ADULT NURSE NOTE - CHIEF COMPLAINT QUOTE
hx of diverticulitis , PE, DVT, MS, PW possible allergic reaction pt took amoxicillin and Tessalon pearls at 1400 and 2000. At 00066 pt reported " mouth feeling numb, and throat closing". pt states feeling lasted " 3-4 minutes". symptoms resolved PTA to ED. pt c/o epigastric " burning" pain. pt speaking in complete sentences, no respiratory distress or hives noted.

## 2022-12-20 ENCOUNTER — RX RENEWAL (OUTPATIENT)
Age: 72
End: 2022-12-20

## 2023-04-04 ENCOUNTER — APPOINTMENT (OUTPATIENT)
Dept: FAMILY MEDICINE | Facility: CLINIC | Age: 73
End: 2023-04-04
Payer: MEDICARE

## 2023-04-04 VITALS
SYSTOLIC BLOOD PRESSURE: 115 MMHG | BODY MASS INDEX: 32.33 KG/M2 | HEIGHT: 67 IN | DIASTOLIC BLOOD PRESSURE: 80 MMHG | WEIGHT: 206 LBS | OXYGEN SATURATION: 97 % | HEART RATE: 88 BPM | TEMPERATURE: 97.8 F

## 2023-04-04 PROCEDURE — 36415 COLL VENOUS BLD VENIPUNCTURE: CPT

## 2023-04-04 PROCEDURE — 99214 OFFICE O/P EST MOD 30 MIN: CPT | Mod: 25

## 2023-04-05 LAB
ALBUMIN SERPL ELPH-MCNC: 4 G/DL
ALP BLD-CCNC: 75 U/L
ALT SERPL-CCNC: 17 U/L
ANION GAP SERPL CALC-SCNC: 17 MMOL/L
AST SERPL-CCNC: 18 U/L
BASOPHILS # BLD AUTO: 0.06 K/UL
BASOPHILS NFR BLD AUTO: 0.9 %
BILIRUB SERPL-MCNC: 0.6 MG/DL
BUN SERPL-MCNC: 19 MG/DL
CALCIUM SERPL-MCNC: 9 MG/DL
CHLORIDE SERPL-SCNC: 103 MMOL/L
CHOLEST SERPL-MCNC: 156 MG/DL
CO2 SERPL-SCNC: 20 MMOL/L
CREAT SERPL-MCNC: 0.8 MG/DL
CREAT SPEC-SCNC: 182 MG/DL
EGFR: 93 ML/MIN/1.73M2
EOSINOPHIL # BLD AUTO: 0.21 K/UL
EOSINOPHIL NFR BLD AUTO: 3.1 %
ESTIMATED AVERAGE GLUCOSE: 123 MG/DL
GLUCOSE SERPL-MCNC: 139 MG/DL
HBA1C MFR BLD HPLC: 5.9 %
HCT VFR BLD CALC: 46.2 %
HDLC SERPL-MCNC: 50 MG/DL
HGB BLD-MCNC: 14 G/DL
IMM GRANULOCYTES NFR BLD AUTO: 0.3 %
LDLC SERPL CALC-MCNC: 93 MG/DL
LYMPHOCYTES # BLD AUTO: 1.39 K/UL
LYMPHOCYTES NFR BLD AUTO: 20.4 %
MAN DIFF?: NORMAL
MCHC RBC-ENTMCNC: 28.7 PG
MCHC RBC-ENTMCNC: 30.3 GM/DL
MCV RBC AUTO: 94.9 FL
MICROALBUMIN 24H UR DL<=1MG/L-MCNC: <1.2 MG/DL
MICROALBUMIN/CREAT 24H UR-RTO: NORMAL MG/G
MONOCYTES # BLD AUTO: 0.58 K/UL
MONOCYTES NFR BLD AUTO: 8.5 %
NEUTROPHILS # BLD AUTO: 4.54 K/UL
NEUTROPHILS NFR BLD AUTO: 66.8 %
NONHDLC SERPL-MCNC: 106 MG/DL
PLATELET # BLD AUTO: 240 K/UL
POTASSIUM SERPL-SCNC: 4.5 MMOL/L
PROT SERPL-MCNC: 6.5 G/DL
RBC # BLD: 4.87 M/UL
RBC # FLD: 14.1 %
SODIUM SERPL-SCNC: 140 MMOL/L
TRIGL SERPL-MCNC: 66 MG/DL
TSH SERPL-ACNC: 2.36 UIU/ML
WBC # FLD AUTO: 6.8 K/UL

## 2023-09-20 NOTE — ASU PREOP CHECKLIST - AS BP NONINV METHOD
electronic High Dose Vitamin A Counseling: Side effects reviewed, pt to contact office should one occur.

## 2023-09-27 ENCOUNTER — NON-APPOINTMENT (OUTPATIENT)
Age: 73
End: 2023-09-27

## 2023-09-27 ENCOUNTER — APPOINTMENT (OUTPATIENT)
Dept: NEUROSURGERY | Facility: CLINIC | Age: 73
End: 2023-09-27
Payer: MEDICARE

## 2023-09-27 VITALS
WEIGHT: 206 LBS | DIASTOLIC BLOOD PRESSURE: 73 MMHG | OXYGEN SATURATION: 98 % | HEIGHT: 67 IN | SYSTOLIC BLOOD PRESSURE: 116 MMHG | BODY MASS INDEX: 32.33 KG/M2 | HEART RATE: 63 BPM

## 2023-09-27 DIAGNOSIS — I67.1 CEREBRAL ANEURYSM, NONRUPTURED: ICD-10-CM

## 2023-09-27 PROCEDURE — 99203 OFFICE O/P NEW LOW 30 MIN: CPT

## 2023-10-16 ENCOUNTER — APPOINTMENT (OUTPATIENT)
Dept: FAMILY MEDICINE | Facility: CLINIC | Age: 73
End: 2023-10-16

## 2023-11-09 ENCOUNTER — APPOINTMENT (OUTPATIENT)
Dept: FAMILY MEDICINE | Facility: CLINIC | Age: 73
End: 2023-11-09
Payer: MEDICARE

## 2023-11-09 ENCOUNTER — APPOINTMENT (OUTPATIENT)
Dept: FAMILY MEDICINE | Facility: CLINIC | Age: 73
End: 2023-11-09

## 2023-11-09 VITALS
OXYGEN SATURATION: 98 % | WEIGHT: 199 LBS | HEIGHT: 67 IN | TEMPERATURE: 97.8 F | RESPIRATION RATE: 17 BRPM | SYSTOLIC BLOOD PRESSURE: 131 MMHG | DIASTOLIC BLOOD PRESSURE: 81 MMHG | HEART RATE: 71 BPM | BODY MASS INDEX: 31.23 KG/M2

## 2023-11-09 PROCEDURE — 90686 IIV4 VACC NO PRSV 0.5 ML IM: CPT

## 2023-11-09 PROCEDURE — G0008: CPT

## 2023-11-09 PROCEDURE — 36415 COLL VENOUS BLD VENIPUNCTURE: CPT

## 2023-11-09 PROCEDURE — 99214 OFFICE O/P EST MOD 30 MIN: CPT | Mod: 25

## 2023-11-09 RX ORDER — MONTELUKAST 10 MG/1
10 TABLET, FILM COATED ORAL DAILY
Qty: 1 | Refills: 0 | Status: ACTIVE | COMMUNITY
Start: 2022-10-04 | End: 1900-01-01

## 2023-11-10 LAB
ALBUMIN SERPL ELPH-MCNC: 4.3 G/DL
ALP BLD-CCNC: 77 U/L
ALT SERPL-CCNC: 12 U/L
ANION GAP SERPL CALC-SCNC: 14 MMOL/L
AST SERPL-CCNC: 16 U/L
BASOPHILS # BLD AUTO: 0.05 K/UL
BASOPHILS NFR BLD AUTO: 0.6 %
BILIRUB SERPL-MCNC: 0.8 MG/DL
BUN SERPL-MCNC: 20 MG/DL
CALCIUM SERPL-MCNC: 9.4 MG/DL
CHLORIDE SERPL-SCNC: 102 MMOL/L
CHOLEST SERPL-MCNC: 178 MG/DL
CO2 SERPL-SCNC: 24 MMOL/L
CREAT SERPL-MCNC: 0.82 MG/DL
EGFR: 93 ML/MIN/1.73M2
EOSINOPHIL # BLD AUTO: 0.09 K/UL
EOSINOPHIL NFR BLD AUTO: 1.2 %
ESTIMATED AVERAGE GLUCOSE: 120 MG/DL
GLUCOSE SERPL-MCNC: 101 MG/DL
HBA1C MFR BLD HPLC: 5.8 %
HCT VFR BLD CALC: 43.6 %
HDLC SERPL-MCNC: 58 MG/DL
HGB BLD-MCNC: 13.4 G/DL
IMM GRANULOCYTES NFR BLD AUTO: 0.3 %
LDLC SERPL CALC-MCNC: 105 MG/DL
LYMPHOCYTES # BLD AUTO: 1.65 K/UL
LYMPHOCYTES NFR BLD AUTO: 21.1 %
MAN DIFF?: NORMAL
MCHC RBC-ENTMCNC: 28.5 PG
MCHC RBC-ENTMCNC: 30.7 GM/DL
MCV RBC AUTO: 92.8 FL
MONOCYTES # BLD AUTO: 0.65 K/UL
MONOCYTES NFR BLD AUTO: 8.3 %
NEUTROPHILS # BLD AUTO: 5.35 K/UL
NEUTROPHILS NFR BLD AUTO: 68.5 %
NONHDLC SERPL-MCNC: 120 MG/DL
PLATELET # BLD AUTO: 228 K/UL
POTASSIUM SERPL-SCNC: 4.4 MMOL/L
PROT SERPL-MCNC: 6.8 G/DL
RBC # BLD: 4.7 M/UL
RBC # FLD: 14 %
SODIUM SERPL-SCNC: 140 MMOL/L
TRIGL SERPL-MCNC: 81 MG/DL
TSH SERPL-ACNC: 2 UIU/ML
WBC # FLD AUTO: 7.81 K/UL

## 2023-12-14 ENCOUNTER — APPOINTMENT (OUTPATIENT)
Dept: INTERNAL MEDICINE | Facility: CLINIC | Age: 73
End: 2023-12-14
Payer: MEDICARE

## 2023-12-14 VITALS
HEART RATE: 71 BPM | WEIGHT: 194 LBS | OXYGEN SATURATION: 99 % | SYSTOLIC BLOOD PRESSURE: 114 MMHG | DIASTOLIC BLOOD PRESSURE: 68 MMHG | HEIGHT: 67 IN | BODY MASS INDEX: 30.45 KG/M2

## 2023-12-14 DIAGNOSIS — D12.6 BENIGN NEOPLASM OF COLON, UNSPECIFIED: ICD-10-CM

## 2023-12-14 DIAGNOSIS — R63.4 ABNORMAL WEIGHT LOSS: ICD-10-CM

## 2023-12-14 PROCEDURE — 99213 OFFICE O/P EST LOW 20 MIN: CPT

## 2023-12-14 NOTE — HISTORY OF PRESENT ILLNESS
[FreeTextEntry1] : h/o myasthenia and blood clots on eliquis . colonsocpy 2021 small polyp. Hematologist is concerned he is losing 70 pound over the last year. he started losing weight since he was diagnosed with DM. he is c/o fatigue but his appetite is normal. No abdominal pain.

## 2023-12-14 NOTE — ASSESSMENT
[FreeTextEntry1] : will start with Ultrasound . and the discuss CT abdomen and pelvis - as per patient DR. Gusman does not want him to get contrast. will discuss him

## 2023-12-14 NOTE — PHYSICAL EXAM

## 2023-12-19 ENCOUNTER — APPOINTMENT (OUTPATIENT)
Dept: FAMILY MEDICINE | Facility: CLINIC | Age: 73
End: 2023-12-19
Payer: MEDICARE

## 2023-12-19 VITALS
SYSTOLIC BLOOD PRESSURE: 116 MMHG | DIASTOLIC BLOOD PRESSURE: 76 MMHG | BODY MASS INDEX: 31.08 KG/M2 | TEMPERATURE: 97.5 F | WEIGHT: 198 LBS | HEART RATE: 68 BPM | OXYGEN SATURATION: 99 % | HEIGHT: 67 IN | RESPIRATION RATE: 16 BRPM

## 2023-12-19 DIAGNOSIS — E11.9 TYPE 2 DIABETES MELLITUS W/OUT COMPLICATIONS: ICD-10-CM

## 2023-12-19 DIAGNOSIS — E78.5 HYPERLIPIDEMIA, UNSPECIFIED: ICD-10-CM

## 2023-12-19 DIAGNOSIS — Z00.00 ENCOUNTER FOR GENERAL ADULT MEDICAL EXAMINATION W/OUT ABNORMAL FINDINGS: ICD-10-CM

## 2023-12-19 DIAGNOSIS — G70.00 MYASTHENIA GRAVIS W/OUT (ACUTE) EXACERBATION: ICD-10-CM

## 2023-12-19 PROCEDURE — G0439: CPT

## 2023-12-19 PROCEDURE — 36415 COLL VENOUS BLD VENIPUNCTURE: CPT

## 2023-12-20 ENCOUNTER — NON-APPOINTMENT (OUTPATIENT)
Age: 73
End: 2023-12-20

## 2024-01-26 RX ORDER — METFORMIN ER 500 MG 500 MG/1
500 TABLET ORAL
Qty: 90 | Refills: 1 | Status: ACTIVE | COMMUNITY
Start: 2022-07-13 | End: 1900-01-01

## 2024-03-28 NOTE — ED ADULT NURSE NOTE - DOES PATIENT HAVE ADVANCE DIRECTIVE
Problem: Physical Therapy  Goal: Physical Therapy Goal  Description: Pt will improve functional independence by performing:    Bed mobility: SBA - MET  Sit to stand: SBA with rolling walker  Bed to chair: SBA with Stand Step  with rolling walker   Car Transfer: SBA with rolling walker  Ambulation x 150' feet with SBA and rolling walker  1 Step (Curb): Min A  and rolling walker - MET  5 Steps: Min A  and B HR - MET  right knee AROM flexion (in degrees): 90  right knee AROM extension (in degrees): 0 - MET  Independent with total knee HEP    Outcome: Ongoing, Progressing      No

## 2024-04-29 ENCOUNTER — APPOINTMENT (OUTPATIENT)
Dept: FAMILY MEDICINE | Facility: CLINIC | Age: 74
End: 2024-04-29
Payer: MEDICARE

## 2024-04-29 VITALS
HEIGHT: 67 IN | TEMPERATURE: 98.1 F | HEART RATE: 78 BPM | DIASTOLIC BLOOD PRESSURE: 62 MMHG | OXYGEN SATURATION: 94 % | RESPIRATION RATE: 16 BRPM | WEIGHT: 190 LBS | BODY MASS INDEX: 29.82 KG/M2 | SYSTOLIC BLOOD PRESSURE: 89 MMHG

## 2024-04-29 DIAGNOSIS — K64.9 UNSPECIFIED HEMORRHOIDS: ICD-10-CM

## 2024-04-29 DIAGNOSIS — R09.89 OTHER SPECIFIED SYMPTOMS AND SIGNS INVOLVING THE CIRCULATORY AND RESPIRATORY SYSTEMS: ICD-10-CM

## 2024-04-29 PROCEDURE — 99495 TRANSJ CARE MGMT MOD F2F 14D: CPT

## 2024-04-29 RX ORDER — SOFT LENS DISINFECTANT
SOLUTION, NON-ORAL MISCELLANEOUS
Qty: 1 | Refills: 0 | Status: ACTIVE | COMMUNITY
Start: 2024-04-29 | End: 1900-01-01

## 2024-04-29 RX ORDER — MIDODRINE HYDROCHLORIDE 5 MG/1
5 TABLET ORAL 3 TIMES DAILY
Refills: 0 | Status: ACTIVE | COMMUNITY
Start: 2024-04-29

## 2024-04-29 RX ORDER — MINERAL OIL, PETROLATUM, PHENYLEPHRINE HCL 2.5; 140; 749 MG/G; MG/G; MG/G
0.25-14-74.9 OINTMENT TOPICAL DAILY
Qty: 1 | Refills: 0 | Status: ACTIVE | COMMUNITY
Start: 2024-04-29 | End: 1900-01-01

## 2024-04-29 NOTE — HISTORY OF PRESENT ILLNESS
[FreeTextEntry1] : discharge follow up  [de-identified] : 72 yo M PMHx Myasthenia Gravis, DVT (on eliquis) presenting for follow up. discharged from [facility] recently. admitted to hospital for loss of consciousness. cardiac testing negative. found to have orthostatic hypothension. started on midodrine due for next dose during this visit. was also found to have pneumonia, completed antibiotics. still having some chest congestion. was prescribed duonebs which he has not tried yet. no new fevers.  dtr noticed declined physical and mental since admission to hospital. was intially independently ambulating and ADLs with some assistance now is in wheelchair, using walker, and needs diaper as well due to weakness. memory also declining gradually over past several months.  in home PT has visiting RN. noticed some rectal bleeding - RN noted some hemmroids.

## 2024-04-29 NOTE — PHYSICAL EXAM
[Normal] : affect was normal and insight and judgment were intact [de-identified] : wheeze upper lobes b/l

## 2024-04-29 NOTE — PLAN
[FreeTextEntry1] : Post discharge  #hypotension - due for midodrine - likely orthostatic 2/2 to MG progression, dehydration and medication  #congestion - advised duonebs PRN sent prescription for device  #MG - fu neurology appt may 12th # hemmroids - prep H, water, stool softeners

## 2024-04-30 DIAGNOSIS — D64.9 ANEMIA, UNSPECIFIED: ICD-10-CM

## 2024-05-01 LAB
ALBUMIN SERPL ELPH-MCNC: 3.7 G/DL
ALP BLD-CCNC: 99 U/L
ALT SERPL-CCNC: 29 U/L
ANION GAP SERPL CALC-SCNC: 18 MMOL/L
AST SERPL-CCNC: 22 U/L
BILIRUB SERPL-MCNC: 0.3 MG/DL
BUN SERPL-MCNC: 23 MG/DL
CALCIUM SERPL-MCNC: 9 MG/DL
CHLORIDE SERPL-SCNC: 102 MMOL/L
CHOLEST SERPL-MCNC: 127 MG/DL
CO2 SERPL-SCNC: 20 MMOL/L
CREAT SERPL-MCNC: 0.91 MG/DL
EGFR: 88 ML/MIN/1.73M2
ESTIMATED AVERAGE GLUCOSE: 114 MG/DL
FERRITIN SERPL-MCNC: 798 NG/ML
FOLATE SERPL-MCNC: 8 NG/ML
GLUCOSE SERPL-MCNC: 99 MG/DL
HBA1C MFR BLD HPLC: 5.6 %
HDLC SERPL-MCNC: 34 MG/DL
IRON SATN MFR SERPL: 24 %
IRON SERPL-MCNC: 53 UG/DL
LDLC SERPL CALC-MCNC: 68 MG/DL
NONHDLC SERPL-MCNC: 92 MG/DL
POTASSIUM SERPL-SCNC: 3.9 MMOL/L
PROT SERPL-MCNC: 6.2 G/DL
SODIUM SERPL-SCNC: 140 MMOL/L
TIBC SERPL-MCNC: 219 UG/DL
TRIGL SERPL-MCNC: 135 MG/DL
UIBC SERPL-MCNC: 167 UG/DL
VIT B12 SERPL-MCNC: 412 PG/ML

## 2024-05-01 NOTE — H&P PST ADULT - HEIGHT IN INCHES
Yohan Hollingsworth MD  4/23/2024  5:17 PM CDT Back to Top      Normal bone density       Patient notified of results/instructions per Dr. Hollingsworth.  Reminder given to check labs and have thyroid US before next visit in July 2024.         7

## 2024-05-02 LAB
BASOPHILS # BLD AUTO: 0.06 K/UL
BASOPHILS NFR BLD AUTO: 0.7 %
EOSINOPHIL # BLD AUTO: 0.12 K/UL
EOSINOPHIL NFR BLD AUTO: 1.3 %
HCT VFR BLD CALC: 39.6 %
HGB BLD-MCNC: 12.7 G/DL
IMM GRANULOCYTES NFR BLD AUTO: 0.3 %
LYMPHOCYTES # BLD AUTO: 1.49 K/UL
LYMPHOCYTES NFR BLD AUTO: 16.8 %
MAN DIFF?: NORMAL
MCHC RBC-ENTMCNC: 29 PG
MCHC RBC-ENTMCNC: 32.1 GM/DL
MCV RBC AUTO: 90.4 FL
MONOCYTES # BLD AUTO: 0.78 K/UL
MONOCYTES NFR BLD AUTO: 8.8 %
NEUTROPHILS # BLD AUTO: 6.41 K/UL
NEUTROPHILS NFR BLD AUTO: 72.1 %
PLATELET # BLD AUTO: 304 K/UL
RBC # BLD: 4.38 M/UL
RBC # FLD: 13.8 %
WBC # FLD AUTO: 8.89 K/UL

## 2024-05-07 ENCOUNTER — NON-APPOINTMENT (OUTPATIENT)
Age: 74
End: 2024-05-07

## 2024-05-08 ENCOUNTER — APPOINTMENT (OUTPATIENT)
Dept: FAMILY MEDICINE | Facility: CLINIC | Age: 74
End: 2024-05-08

## 2024-05-21 NOTE — ED ADULT NURSE NOTE - NS ED NURSE NOTIFICATIONS
normal/ROM intact/normal gait/strength 5/5 bilateral upper extremities/strength 5/5 bilateral lower extremities
Family notified

## 2024-05-24 ENCOUNTER — APPOINTMENT (OUTPATIENT)
Dept: FAMILY MEDICINE | Facility: CLINIC | Age: 74
End: 2024-05-24
Payer: MEDICARE

## 2024-05-24 VITALS
RESPIRATION RATE: 14 BRPM | BODY MASS INDEX: 29.82 KG/M2 | TEMPERATURE: 98.7 F | DIASTOLIC BLOOD PRESSURE: 52 MMHG | WEIGHT: 190 LBS | SYSTOLIC BLOOD PRESSURE: 86 MMHG | HEART RATE: 74 BPM | HEIGHT: 67 IN | OXYGEN SATURATION: 96 %

## 2024-05-24 VITALS — SYSTOLIC BLOOD PRESSURE: 80 MMHG | DIASTOLIC BLOOD PRESSURE: 52 MMHG

## 2024-05-24 DIAGNOSIS — R33.9 RETENTION OF URINE, UNSPECIFIED: ICD-10-CM

## 2024-05-24 DIAGNOSIS — I95.1 ORTHOSTATIC HYPOTENSION: ICD-10-CM

## 2024-05-24 DIAGNOSIS — K59.00 CONSTIPATION, UNSPECIFIED: ICD-10-CM

## 2024-05-24 DIAGNOSIS — Z09 ENCOUNTER FOR FOLLOW-UP EXAMINATION AFTER COMPLETED TREATMENT FOR CONDITIONS OTHER THAN MALIGNANT NEOPLASM: ICD-10-CM

## 2024-05-24 PROCEDURE — 99214 OFFICE O/P EST MOD 30 MIN: CPT

## 2024-05-24 RX ORDER — RIVASTIGMINE 13.3 MG/24H
13.3 PATCH, EXTENDED RELEASE TRANSDERMAL
Refills: 0 | Status: ACTIVE | COMMUNITY
Start: 2024-05-24

## 2024-05-24 RX ORDER — MEMANTINE HYDROCHLORIDE 10 MG/1
10 TABLET, FILM COATED ORAL DAILY
Refills: 0 | Status: ACTIVE | COMMUNITY
Start: 2024-05-24

## 2024-05-24 RX ORDER — TAMSULOSIN HYDROCHLORIDE 0.4 MG/1
0.4 CAPSULE ORAL
Qty: 1 | Refills: 0 | Status: ACTIVE | COMMUNITY
Start: 2024-05-24

## 2024-05-24 RX ORDER — PROMETHAZINE HYDROCHLORIDE AND DEXTROMETHORPHAN HYDROBROMIDE ORAL SOLUTION 15; 6.25 MG/5ML; MG/5ML
6.25-15 SOLUTION ORAL
Qty: 240 | Refills: 0 | Status: DISCONTINUED | COMMUNITY
Start: 2023-12-19 | End: 2024-05-24

## 2024-05-27 PROBLEM — I95.1 PRIMARY ORTHOSTATIC HYPOTENSION: Status: RESOLVED | Noted: 2024-04-29 | Resolved: 2024-05-24

## 2024-05-27 NOTE — PLAN
[FreeTextEntry1] : counseled  reconciled meds.  will retrieve documents from [facility].  # orthostatic hypotension  cont. Midodrine 5 mg tid.  cardiology follow up.

## 2024-05-27 NOTE — HISTORY OF PRESENT ILLNESS
[Post-hospitalization from ___ Hospital] : Post-hospitalization from [unfilled] Hospital [Admitted on: ___] : The patient was admitted on [unfilled] [Discharged on ___] : discharged on [unfilled] [FreeTextEntry2] : Mr. PHOENIX MOLINA is a 73-year-old male presents today for hospital discharge for acute urinary retention, and constipation.  He is accompanied by spouse and daughter.   Reports doing well. denies urinary symptoms. Reports good BM's now.  BP found to be low. On midodrine 5 mg TID. Denies dizziness, syncope, CP, palpitations.

## 2024-05-27 NOTE — PHYSICAL EXAM
[No Acute Distress] : no acute distress [Well-Appearing] : well-appearing [No Respiratory Distress] : no respiratory distress  [No Accessory Muscle Use] : no accessory muscle use [Clear to Auscultation] : lungs were clear to auscultation bilaterally [Normal Rate] : normal rate  [Regular Rhythm] : with a regular rhythm [Normal S1, S2] : normal S1 and S2 [No Murmur] : no murmur heard [Soft] : abdomen soft [Non Tender] : non-tender [No HSM] : no HSM [Non-distended] : non-distended [Normal Bowel Sounds] : normal bowel sounds [Normal Affect] : the affect was normal [Alert and Oriented x3] : oriented to person, place, and time [Normal Insight/Judgement] : insight and judgment were intact

## 2024-05-29 ENCOUNTER — APPOINTMENT (OUTPATIENT)
Dept: FAMILY MEDICINE | Facility: CLINIC | Age: 74
End: 2024-05-29

## 2024-06-11 ENCOUNTER — APPOINTMENT (OUTPATIENT)
Dept: GASTROENTEROLOGY | Facility: CLINIC | Age: 74
End: 2024-06-11

## 2024-06-26 ENCOUNTER — RX RENEWAL (OUTPATIENT)
Age: 74
End: 2024-06-26

## 2024-12-04 NOTE — OCCUPATIONAL THERAPY INITIAL EVALUATION ADULT - ADDITIONAL COMMENTS
Name of Medication(s) Requested:  Requested Prescriptions     Pending Prescriptions Disp Refills    levothyroxine (SYNTHROID) 100 MCG tablet 90 tablet 1     Sig: TAKE ONE TABLET BY MOUTH EVERY DAY    escitalopram (LEXAPRO) 20 MG tablet 90 tablet 1     Sig: Take 1 tablet by mouth daily    Levonorgest-Eth Estrad 91-Day (CAMRESE LO) 0.1-0.02 & 0.01 MG TABS 91 tablet 1     Sig: TAKE ONE TABLET BY MOUTH DAILY    cyclobenzaprine (FLEXERIL) 10 MG tablet 180 tablet 1     Sig: TAKE TWO TABLETS BY MOUTH EVERY EVENING       Medication is on current medication list Yes    Dosage and directions were verified? Yes    Quantity verified: 90 day supply     Pharmacy Verified?  Yes    Last Appointment:  7/10/2024    Future appts:  No future appointments.     (If no appt send self scheduling link. .REFILLAPPT)  Scheduling request sent?     [] Yes  [x] No    Does patient need updated?  [] Yes  [x] No   CT H and CTA H/N done showed high suspicion for L transverse sinus thrombosis with no associated hemorrhage or indication of venous infarct. Pt was started on a heparin drip, symptomatically pt improved. CTH: No CT evidence of acute intracranial hemorrhage, mass or infarct. High density within the superior sagittal, transverse and sigmoid sinuses as well as the straight sinus and jugular bulbs is suspicious for venous thrombosis. MRV suggested for further evaluation.

## 2024-12-25 PROBLEM — F10.90 ALCOHOL USE: Status: INACTIVE | Noted: 2017-06-02

## 2025-01-29 ENCOUNTER — RX RENEWAL (OUTPATIENT)
Age: 75
End: 2025-01-29

## 2025-03-12 NOTE — PATIENT PROFILE ADULT. - CENTRAL VENOUS CATHETER
Please take lamotrigine SR24 150 mg every day.    Please stop lamotrigine immediately and  seek emergent medical attention if you experience any spreading skin rash, lesions in your mouth/private areas, and/or fever.     Please continue vitamin D supplementation.    Please let our office know if you have any changes in your seizure frequency and/characteristics. Otherwise, please keep the diary of your events and bring it with you at the time of your next follow up visit with our office.     Please take folic acid 1 mg daily. This is an over-the-counter supplement that is recommended to prevent certain developmental problems in your baby, in case you become pregnant in the future.    Please let our office know as soon as you become pregnant or plan to become pregnant.    If you are caring for a baby/young child, please make sure to be sitting on a soft surface while holding your baby/young child, so in case you have a seizure, your baby/young child is not injured due to fall.     Please let us know if you observe that your baby is excessively sleepy/has other changes and the pediatrician feels that there are no other explanations except possible adverse effects of antiseizure medication(s) your are taking while nursing your baby.     Please note that the following might precipitate seizures: missed doses of antiseizure medications, being sick with fever, stress, fatigue, sleep deprivation, not eating regularly, not drinking enough water, drinking too much alcohol, stopping alcohol suddenly if you are currently using it on a regular/daily basis, and/or using recreational drugs, among others.    Please note that the following might lead to an injury, potentially a life-threatening injury, in case you have a seizure and/or lose awareness while:   - being in a large body of water by yourself, such as bath, pool, lake, ocean, among others (risk of drowning)   - being on unprotected heights (risk of fall)   - being around  and/or operating heavy machinery (risk of injury)   - being around open fire/hot surfaces (risk of burns)   - any other activities/circumstances, in which if you lose awareness, you might injure yourself and/or others.    Please call for help (crisis line and/or 911) in case you have thoughts of harming yourself and/or others.    Please abstain from driving until further notice.    ------------------------------------------------------------------------------------------  Instructions for your family/caregivers:  Please call 911 if the patient has a seizure longer than 2-3 minutes, if seizures are back to back without him recovering to his baseline, or he does not start recovering within 5-10 minutes after the seizure stops. During the seizure - please turn him on his side, please make sure his head is protected (for example, you should put a pillow under his head, if one is available), and please do not put anything in his mouth.   -------------------------------------------------------------------------------------------    It is important that your seizures are well controlled and you have none or have them rarely. In addition to avoiding injury related to breakthrough seizures, frequent seizures increase risk of SUDEP (sudden unexpected death in epilepsy), where a person goes into a seizure and then never wakes up - this is a rare complication of seizure disorder; one of the best available ways to prevent it is to control your seizures well.     Due to the high volume of patients we are trying to help, your physician will not be able to respond by phone or in MyChart to your routine concerns between appointments.  This does not reflect a lack of interest or concern for you or your diagnosis.  Please bring these questions and concerns to your appointment where your physician can answer.  Please relay more pressing concerns to our office, either via [employer], or by phone; if not able to reach us please visit nearby  Urgent Care Center or Emergency Department.  If any emergent medical needs, please seek emergent medical help and/or call 911.    Please note that we are not able to fill out paperwork that might be related to your work, utility company, disability, and/or driving, among others, in between the visits.  Please schedule a dedicated appointment to address your paperwork, so we can do that in a timely manner.  This is not due to lack of concern or interest for your disease-related work/administrative problems, but to make sure that we provide the best possible care and to fill out your paperwork in a correct and timely manner.    Thank you for entrusting your neurological care to [facility] and we look forward to continuing to serve you.       Due to the high volume of patients we are trying to help, your physician will not be able to respond by phone or in [employer] to your routine concerns between appointments.  This does not reflect a lack of interest or concern for you or your diagnosis.  Please bring these questions and concerns to your appointment where your physician can answer.  Please relay more pressing concerns to our office, either via [employer], or by phone; if not able to reach us please visit nearby Urgent Care Center or Emergency Department.  If any emergent medical needs, please seek emergent medical help and/or call 911.    Please note that we are not able to fill out paperwork that might be related to your work, utility company, disability, and/or driving, among others, in between the visits.  Please schedule a dedicated appointment to address your paperwork, so we can do that in a timely manner.  This is not due to lack of concern or interest for your disease-related work/administrative problems, but to make sure that we provide the best possible care and to fill out your paperwork in a correct and timely manner.    Thank you for entrusting your neurological care to [facility] and we look  forward to continuing to serve you.   no